# Patient Record
Sex: FEMALE | Race: WHITE | NOT HISPANIC OR LATINO | Employment: OTHER | URBAN - METROPOLITAN AREA
[De-identification: names, ages, dates, MRNs, and addresses within clinical notes are randomized per-mention and may not be internally consistent; named-entity substitution may affect disease eponyms.]

---

## 2022-10-05 ENCOUNTER — HOSPITAL ENCOUNTER (OUTPATIENT)
Dept: RADIOLOGY | Facility: HOSPITAL | Age: 67
Discharge: HOME | End: 2022-10-05
Payer: MEDICARE

## 2022-10-05 DIAGNOSIS — M54.2 NECK PAIN: ICD-10-CM

## 2022-10-05 PROCEDURE — 72141 MRI NECK SPINE W/O DYE: CPT

## 2022-10-07 ENCOUNTER — HOSPITAL ENCOUNTER (OUTPATIENT)
Dept: RADIOLOGY | Facility: HOSPITAL | Age: 67
Discharge: HOME | End: 2022-10-07
Payer: MEDICARE

## 2022-10-07 DIAGNOSIS — M54.9 BACK PAIN, UNSPECIFIED BACK LOCATION, UNSPECIFIED BACK PAIN LATERALITY, UNSPECIFIED CHRONICITY: ICD-10-CM

## 2022-10-07 PROCEDURE — 72148 MRI LUMBAR SPINE W/O DYE: CPT

## 2022-11-29 ENCOUNTER — OFFICE VISIT (OUTPATIENT)
Dept: OTOLARYNGOLOGY | Facility: CLINIC | Age: 67
End: 2022-11-29

## 2022-11-29 ENCOUNTER — OFFICE VISIT (OUTPATIENT)
Dept: AUDIOLOGY | Facility: CLINIC | Age: 67
End: 2022-11-29

## 2022-11-29 VITALS — HEIGHT: 61 IN | WEIGHT: 123 LBS | TEMPERATURE: 98.2 F | BODY MASS INDEX: 23.22 KG/M2

## 2022-11-29 DIAGNOSIS — H60.8X1 CHRONIC REACTIVE OTITIS EXTERNA OF RIGHT EAR: ICD-10-CM

## 2022-11-29 DIAGNOSIS — H90.3 SENSORY HEARING LOSS, BILATERAL: ICD-10-CM

## 2022-11-29 DIAGNOSIS — R42 VERTIGO: Primary | ICD-10-CM

## 2022-11-29 RX ORDER — ESTRADIOL 1 MG/1
TABLET ORAL
COMMUNITY
Start: 2022-11-25

## 2022-11-29 RX ORDER — EZETIMIBE 10 MG/1
TABLET ORAL
COMMUNITY
Start: 2022-08-03

## 2022-11-29 RX ORDER — FLUCONAZOLE 150 MG/1
TABLET ORAL
COMMUNITY
Start: 2022-10-12

## 2022-11-29 RX ORDER — ENALAPRIL MALEATE 10 MG/1
10 TABLET ORAL DAILY
COMMUNITY

## 2022-11-29 RX ORDER — LEVOTHYROXINE SODIUM 0.1 MG/1
100 TABLET ORAL DAILY
COMMUNITY
Start: 2022-11-11

## 2022-11-29 RX ORDER — HYDROCODONE BITARTRATE AND ACETAMINOPHEN 5; 325 MG/1; MG/1
2 TABLET ORAL EVERY 6 HOURS PRN
COMMUNITY

## 2022-11-29 RX ORDER — CYCLOBENZAPRINE HCL 5 MG
5 TABLET ORAL 3 TIMES DAILY PRN
COMMUNITY
Start: 2022-10-12

## 2022-11-29 NOTE — ASSESSMENT & PLAN NOTE
Symptoms include dizziness since having URI (Covid) over a month ago  No cerumen impaction or serous fluid noted on exam  Small piece of dried skin debris removed right eac, this was not occluding  Discussed possible causes of vertigo including brain, cardiac, autoimmune, otitis media, sinusitis, viral illness, and inner ear concerns  Suggest audiogram today to further evaluate bilateral ears to rule out otitis media and meniere's disease  Audiogram today indicating mild bilateral high frequency SNHL, Tymps type A bilaterally, word discrimination 100%  No CHL  No otitis media  Treatment options include at home epley's, oral steroids, decongestants, nasal steroids, saline rinses lab studies, vestibular therapy,  VNG testing, neurology consultation, MRI brain with IAC  After discussion agreed to medication options Saline rinses daily, Fluticasone nasal spray one spray each nostril twice per day, Allegra D for one to two weeks  Home exercises and Physical therapy as soon as possible   She will consider PT but notes she is having back surgery next week and concerned about time frame     Follow up if needed for no improvement

## 2022-11-29 NOTE — PROGRESS NOTES
Assessment/Plan:    Vertigo  Symptoms include dizziness since having URI (Covid) over a month ago  No cerumen impaction or serous fluid noted on exam  Small piece of dried skin debris removed right eac, this was not occluding  Discussed possible causes of vertigo including brain, cardiac, autoimmune, otitis media, sinusitis, viral illness, and inner ear concerns  Suggest audiogram today to further evaluate bilateral ears to rule out otitis media and meniere's disease  Audiogram today indicating mild bilateral high frequency SNHL, Tymps type A bilaterally, word discrimination 100%  No CHL  No otitis media  Treatment options include at home epley's, oral steroids, decongestants, nasal steroids, saline rinses lab studies, vestibular therapy,  VNG testing, neurology consultation, MRI brain with IAC  After discussion agreed to medication options Saline rinses daily, Fluticasone nasal spray one spray each nostril twice per day, Allegra D for one to two weeks  Home exercises and Physical therapy as soon as possible   She will consider PT but notes she is having back surgery next week and concerned about time frame  Follow up if needed for no improvement        Chronic reactive otitis externa of right ear  Reviewed otitis externa bilaterally causing itching sensation  On exam noted mild irritation of right eac  Discussed natural process of oil and cerumen within the eac and restoring moisture  Reviewed options for treatment including watchful monitoring, hydrocortisone cream to the eac, oil based products, decreased q-tip usage  Ear drops script to pharmacy for 7 days           Diagnoses and all orders for this visit:    Vertigo  -     Ambulatory referral to Audiology  -     Ambulatory Referral to Physical Therapy;  Future    Chronic reactive otitis externa of right ear  -     neomycin-polymyxin-hydrocortisone (CORTISPORIN) otic solution; Administer 4 drops to the right ear 2 (two) times a day for 7 days    Other orders  -     levothyroxine 100 mcg tablet; Take 100 mcg by mouth daily  -     fluconazole (DIFLUCAN) 150 mg tablet; 1 PILL EVERY OTHER DAY X 3 DOSES (Patient not taking: Reported on 11/29/2022)  -     ezetimibe (ZETIA) 10 mg tablet  -     estradiol (ESTRACE) 1 mg tablet  -     enalapril (VASOTEC) 10 mg tablet; Take 10 mg by mouth daily  -     cyclobenzaprine (FLEXERIL) 5 mg tablet; Take 5 mg by mouth 3 (three) times a day as needed  -     Cholecalciferol 1 25 MG (41281 UT) capsule; Take 1 capsule by mouth once a week (Patient not taking: Reported on 11/29/2022)  -     HYDROcodone-acetaminophen (NORCO) 5-325 mg per tablet; Take 2 tablets by mouth every 6 (six) hours as needed for pain          Subjective:      Patient ID: Nick Escamilla is a 78 y o  female  Presents today as a new patient due to ear concerns  Symptoms occurring for couple of weeks  Bilateral ears feel blocked  Dizziness began few days ago  Occurs while lying down and if puts head back  Worse turning to right  Whooshing noise in ears  Ears feel sticky  No otalgia or otorrhea  No history of ear surgery  No current hearing aids  Sought treatment in urgent care one week ago who attempted clean ears  Pending surgery for lower back           The following portions of the patient's history were reviewed and updated as appropriate: allergies, current medications, past family history, past medical history, past social history, past surgical history and problem list     Review of Systems   Constitutional: Negative  HENT: Negative for congestion, ear discharge, ear pain, hearing loss, nosebleeds, postnasal drip, rhinorrhea, sinus pressure, sinus pain, sore throat, tinnitus and voice change  Eyes: Negative  Respiratory: Negative for chest tightness and shortness of breath  Cardiovascular: Negative  Gastrointestinal: Negative  Endocrine: Negative  Musculoskeletal: Negative  Skin: Negative for color change  Neurological: Positive for dizziness  Negative for numbness and headaches  Psychiatric/Behavioral: Negative  Objective:      Temp 98 2 °F (36 8 °C) (Temporal)   Ht 5' 1" (1 549 m)   Wt 55 8 kg (123 lb)   BMI 23 24 kg/m²          Physical Exam  Constitutional:       Appearance: She is well-developed and well-nourished  HENT:      Head: Normocephalic  Right Ear: Hearing, tympanic membrane, ear canal and external ear normal  No decreased hearing noted  No drainage or tenderness  Tympanic membrane is not perforated or erythematous  Left Ear: Hearing, tympanic membrane, ear canal and external ear normal  No decreased hearing noted  No drainage or tenderness  Tympanic membrane is not perforated or erythematous  Nose: Nose normal  No nasal deformity, septal deviation or sinus tenderness  Mouth/Throat:      Mouth: Oropharynx is clear and moist and mucous membranes are normal  Mucous membranes are not pale and not dry  No oral lesions  Dentition: Normal dentition  Pharynx: Uvula midline  No oropharyngeal exudate  Neck:      Trachea: No tracheal deviation  Cardiovascular:      Rate and Rhythm: Normal rate  Pulmonary:      Effort: Pulmonary effort is normal  No accessory muscle usage or respiratory distress  Musculoskeletal:      Right shoulder: Normal range of motion  Cervical back: Full passive range of motion without pain, normal range of motion and neck supple  Lymphadenopathy:      Cervical: No cervical adenopathy  Skin:     General: Skin is warm, dry and intact  Neurological:      Mental Status: She is alert and oriented to person, place, and time  Cranial Nerves: No cranial nerve deficit  Sensory: No sensory deficit  Psychiatric:         Mood and Affect: Mood and affect normal          Behavior: Behavior is cooperative

## 2022-11-29 NOTE — ASSESSMENT & PLAN NOTE
Reviewed otitis externa bilaterally causing itching sensation  On exam noted mild irritation of right eac  Discussed natural process of oil and cerumen within the eac and restoring moisture  Reviewed options for treatment including watchful monitoring, hydrocortisone cream to the eac, oil based products, decreased q-tip usage     Ear drops script to pharmacy for 7 days

## 2022-11-29 NOTE — PROGRESS NOTES
ENT HEARING EVALUATION    Name:  Migdalia Stanton  :  1955  Age:  78 y o  Date of Evaluation: 22     History: ENT Audiogram / Vertigo   Reason for visit: Migdalia Stanton is being seen today at the request of Ms Kenneth CARTAGENA for an evaluation of hearing as part of their initial ENT visit  EVALUATION:    Otoscopic Evaluation:   Right Ear: clear canal, narrow and small    Left Ear:   clear canal, narrow and small     Tympanometry:   Right: Type A - normal middle ear pressure and compliance   Left: Type A - normal middle ear pressure and compliance    Audiogram Results: Within or bordering normal limits, 250-4000 Hz, sloping to a mild SHL 9025-0403 Hz  Word recognition scores, in quiet, are 100% for both ears at 55 dBHL  *see attached audiogram    RECOMMENDATIONS:  1  Follow up per MITZI Reynolds   2   Further audiological testing (Balance) upon recommendation    Lise Lowry , [facility], [place]# [id number]  Clinical Audiologist

## 2023-03-02 ENCOUNTER — HOSPITAL ENCOUNTER (OUTPATIENT)
Dept: CT IMAGING | Facility: HOSPITAL | Age: 68
Discharge: HOME | End: 2023-03-02

## 2023-03-02 DIAGNOSIS — I10 ESSENTIAL (PRIMARY) HYPERTENSION: ICD-10-CM

## 2023-05-11 ENCOUNTER — HOSPITAL ENCOUNTER (OUTPATIENT)
Dept: RADIOLOGY | Facility: HOSPITAL | Age: 68
Discharge: HOME | End: 2023-05-11

## 2023-05-11 ENCOUNTER — APPOINTMENT (OUTPATIENT)
Dept: RADIOLOGY | Facility: HOSPITAL | Age: 68
End: 2023-05-11

## 2023-05-11 VITALS — BODY MASS INDEX: 22.47 KG/M2 | HEIGHT: 61 IN | WEIGHT: 119 LBS

## 2023-05-11 DIAGNOSIS — M81.0 AGE-RELATED OSTEOPOROSIS WITHOUT CURRENT PATHOLOGICAL FRACTURE: ICD-10-CM

## 2023-09-28 ENCOUNTER — HOSPITAL ENCOUNTER (OUTPATIENT)
Dept: RADIOLOGY | Facility: HOSPITAL | Age: 68
Discharge: HOME | End: 2023-09-28
Payer: MEDICARE

## 2023-09-28 DIAGNOSIS — M48.062 SPINAL STENOSIS, LUMBAR REGION, WITH NEUROGENIC CLAUDICATION: ICD-10-CM

## 2023-09-28 PROCEDURE — 72148 MRI LUMBAR SPINE W/O DYE: CPT

## 2023-09-28 PROCEDURE — G1004 CDSM NDSC: HCPCS

## 2023-12-12 ENCOUNTER — HOSPITAL ENCOUNTER (OUTPATIENT)
Dept: RADIOLOGY | Facility: HOSPITAL | Age: 68
Discharge: HOME | End: 2023-12-12
Payer: MEDICARE

## 2023-12-12 DIAGNOSIS — M75.102 ROTATOR CUFF SYNDROME OF LEFT SHOULDER: ICD-10-CM

## 2023-12-12 DIAGNOSIS — M25.512 LEFT SHOULDER PAIN, UNSPECIFIED CHRONICITY: ICD-10-CM

## 2023-12-12 PROCEDURE — 73221 MRI JOINT UPR EXTREM W/O DYE: CPT

## 2023-12-20 ENCOUNTER — HOSPITAL ENCOUNTER (OUTPATIENT)
Dept: RADIOLOGY | Facility: HOSPITAL | Age: 68
Discharge: HOME | End: 2023-12-20
Payer: MEDICARE

## 2023-12-20 DIAGNOSIS — J32.9 CHRONIC SINUSITIS, UNSPECIFIED LOCATION: ICD-10-CM

## 2023-12-20 PROCEDURE — 70220 X-RAY EXAM OF SINUSES: CPT

## 2024-02-28 ENCOUNTER — HOSPITAL ENCOUNTER (OUTPATIENT)
Dept: RADIOLOGY | Facility: HOSPITAL | Age: 69
Discharge: HOME | End: 2024-02-28
Payer: MEDICARE

## 2024-02-28 DIAGNOSIS — M54.12 BRACHIAL NEURITIS: ICD-10-CM

## 2024-02-28 PROCEDURE — 72141 MRI NECK SPINE W/O DYE: CPT

## 2024-02-28 PROCEDURE — G1004 CDSM NDSC: HCPCS

## 2024-06-03 ENCOUNTER — RA CDI HCC (OUTPATIENT)
Dept: OTHER | Facility: HOSPITAL | Age: 69
End: 2024-06-03

## 2024-06-06 RX ORDER — PROGESTERONE 100 MG/1
100 CAPSULE ORAL DAILY
COMMUNITY

## 2024-06-06 RX ORDER — DIAZEPAM 2 MG/1
2 TABLET ORAL EVERY 6 HOURS PRN
COMMUNITY
Start: 2024-02-26 | End: 2024-06-07 | Stop reason: SDUPTHER

## 2024-06-06 RX ORDER — FLUTICASONE PROPIONATE 50 MCG
1 SPRAY, SUSPENSION (ML) NASAL AS NEEDED
COMMUNITY

## 2024-06-06 RX ORDER — TIZANIDINE 4 MG/1
4 TABLET ORAL ONCE
COMMUNITY
Start: 2024-04-04

## 2024-06-06 RX ORDER — LORAZEPAM 2 MG/1
2 TABLET ORAL DAILY
COMMUNITY
End: 2024-06-07 | Stop reason: ALTCHOICE

## 2024-06-06 RX ORDER — AMOXICILLIN AND CLAVULANATE POTASSIUM 875; 125 MG/1; MG/1
1 TABLET, FILM COATED ORAL EVERY 12 HOURS SCHEDULED
COMMUNITY
Start: 2024-02-01 | End: 2024-06-07 | Stop reason: ALTCHOICE

## 2024-06-07 ENCOUNTER — OFFICE VISIT (OUTPATIENT)
Dept: FAMILY MEDICINE CLINIC | Facility: CLINIC | Age: 69
End: 2024-06-07
Payer: MEDICARE

## 2024-06-07 ENCOUNTER — TELEPHONE (OUTPATIENT)
Age: 69
End: 2024-06-07

## 2024-06-07 VITALS
OXYGEN SATURATION: 96 % | HEART RATE: 71 BPM | SYSTOLIC BLOOD PRESSURE: 136 MMHG | TEMPERATURE: 96.9 F | WEIGHT: 114 LBS | BODY MASS INDEX: 20.98 KG/M2 | DIASTOLIC BLOOD PRESSURE: 90 MMHG | RESPIRATION RATE: 18 BRPM | HEIGHT: 62 IN

## 2024-06-07 DIAGNOSIS — G89.29 CHRONIC CERVICAL PAIN: ICD-10-CM

## 2024-06-07 DIAGNOSIS — M19.032 PRIMARY OSTEOARTHRITIS OF BOTH WRISTS: ICD-10-CM

## 2024-06-07 DIAGNOSIS — Z12.31 ENCOUNTER FOR SCREENING MAMMOGRAM FOR BREAST CANCER: ICD-10-CM

## 2024-06-07 DIAGNOSIS — Z76.89 ENCOUNTER TO ESTABLISH CARE: ICD-10-CM

## 2024-06-07 DIAGNOSIS — J01.40 ACUTE NON-RECURRENT PANSINUSITIS: Primary | ICD-10-CM

## 2024-06-07 DIAGNOSIS — B37.31 YEAST VAGINITIS: ICD-10-CM

## 2024-06-07 DIAGNOSIS — N39.0 UTI (URINARY TRACT INFECTION), BACTERIAL: ICD-10-CM

## 2024-06-07 DIAGNOSIS — E78.01 FAMILIAL HYPERCHOLESTEROLEMIA: ICD-10-CM

## 2024-06-07 DIAGNOSIS — M19.031 PRIMARY OSTEOARTHRITIS OF BOTH WRISTS: ICD-10-CM

## 2024-06-07 DIAGNOSIS — Z13.0 SCREENING FOR DEFICIENCY ANEMIA: ICD-10-CM

## 2024-06-07 DIAGNOSIS — Z11.59 NEED FOR HEPATITIS C SCREENING TEST: ICD-10-CM

## 2024-06-07 DIAGNOSIS — Z13.1 SCREENING FOR DIABETES MELLITUS: ICD-10-CM

## 2024-06-07 DIAGNOSIS — I10 PRIMARY HYPERTENSION: ICD-10-CM

## 2024-06-07 DIAGNOSIS — E03.9 ACQUIRED HYPOTHYROIDISM: ICD-10-CM

## 2024-06-07 DIAGNOSIS — F43.23 ADJUSTMENT DISORDER WITH MIXED ANXIETY AND DEPRESSED MOOD: Primary | ICD-10-CM

## 2024-06-07 DIAGNOSIS — M54.2 CHRONIC CERVICAL PAIN: ICD-10-CM

## 2024-06-07 DIAGNOSIS — Z23 ENCOUNTER FOR IMMUNIZATION: ICD-10-CM

## 2024-06-07 DIAGNOSIS — Z12.11 SCREENING FOR COLON CANCER: ICD-10-CM

## 2024-06-07 DIAGNOSIS — Z79.899 HIGH RISK MEDICATION USE: ICD-10-CM

## 2024-06-07 DIAGNOSIS — A49.9 UTI (URINARY TRACT INFECTION), BACTERIAL: ICD-10-CM

## 2024-06-07 DIAGNOSIS — G89.29 CHRONIC RADICULAR PAIN OF LOWER BACK: ICD-10-CM

## 2024-06-07 DIAGNOSIS — M54.16 CHRONIC RADICULAR PAIN OF LOWER BACK: ICD-10-CM

## 2024-06-07 DIAGNOSIS — Z23 NEED FOR SHINGLES VACCINE: ICD-10-CM

## 2024-06-07 PROBLEM — Z98.1 S/P LUMBAR FUSION: Status: ACTIVE | Noted: 2022-12-07

## 2024-06-07 PROBLEM — M43.17 SPONDYLOLISTHESIS OF LUMBOSACRAL REGION: Status: ACTIVE | Noted: 2022-12-07

## 2024-06-07 LAB
SL AMB  POCT GLUCOSE, UA: NORMAL
SL AMB LEUKOCYTE ESTERASE,UA: 25
SL AMB POCT BILIRUBIN,UA: NEGATIVE
SL AMB POCT BLOOD,UA: NEGATIVE
SL AMB POCT CLARITY,UA: CLEAR
SL AMB POCT COLOR,UA: YELLOW
SL AMB POCT KETONES,UA: 15
SL AMB POCT NITRITE,UA: NEGATIVE
SL AMB POCT PH,UA: 6.5
SL AMB POCT SPECIFIC GRAVITY,UA: 1.01
SL AMB POCT URINE PROTEIN: NEGATIVE
SL AMB POCT UROBILINOGEN: NORMAL

## 2024-06-07 PROCEDURE — 99204 OFFICE O/P NEW MOD 45 MIN: CPT | Performed by: NURSE PRACTITIONER

## 2024-06-07 PROCEDURE — 90677 PCV20 VACCINE IM: CPT

## 2024-06-07 PROCEDURE — G0009 ADMIN PNEUMOCOCCAL VACCINE: HCPCS

## 2024-06-07 PROCEDURE — 81003 URINALYSIS AUTO W/O SCOPE: CPT | Performed by: NURSE PRACTITIONER

## 2024-06-07 RX ORDER — ENALAPRIL MALEATE 10 MG/1
10 TABLET ORAL DAILY
Qty: 30 TABLET | Refills: 0 | Status: SHIPPED | OUTPATIENT
Start: 2024-06-07 | End: 2024-06-07

## 2024-06-07 RX ORDER — FEXOFENADINE HCL 60 MG/1
60 TABLET, FILM COATED ORAL DAILY
COMMUNITY

## 2024-06-07 RX ORDER — CANDESARTAN 16 MG/1
16 TABLET ORAL DAILY
Qty: 90 TABLET | Refills: 0 | Status: SHIPPED | OUTPATIENT
Start: 2024-06-07

## 2024-06-07 RX ORDER — ROSUVASTATIN CALCIUM 10 MG/1
10 TABLET, COATED ORAL DAILY
COMMUNITY

## 2024-06-07 RX ORDER — ZOSTER VACCINE RECOMBINANT, ADJUVANTED 50 MCG/0.5
0.5 KIT INTRAMUSCULAR ONCE
Qty: 1 EACH | Refills: 1 | Status: SHIPPED | OUTPATIENT
Start: 2024-06-07 | End: 2024-06-07

## 2024-06-07 RX ORDER — DIAZEPAM 2 MG/1
2 TABLET ORAL DAILY PRN
Qty: 15 TABLET | Refills: 0 | Status: SHIPPED | OUTPATIENT
Start: 2024-06-07

## 2024-06-07 RX ORDER — PAROXETINE 10 MG/1
10 TABLET, FILM COATED ORAL DAILY
Qty: 90 TABLET | Refills: 0 | Status: SHIPPED | OUTPATIENT
Start: 2024-06-07

## 2024-06-07 RX ORDER — NALOXONE HYDROCHLORIDE 4 MG/.1ML
SPRAY NASAL
Qty: 1 EACH | Refills: 1 | Status: SHIPPED | OUTPATIENT
Start: 2024-06-07 | End: 2025-06-07

## 2024-06-07 NOTE — TELEPHONE ENCOUNTER
She can continue flonase and allegra and if no improvement over the weekend, I will call in an antibiotic on Monday.    Chiquita Medley

## 2024-06-07 NOTE — PROGRESS NOTES
Assessment/Plan:    1. Adjustment disorder with mixed anxiety and depressed mood  -     PARoxetine (PAXIL) 10 mg tablet; Take 1 tablet (10 mg total) by mouth daily  -     diazepam (VALIUM) 2 mg tablet; Take 1 tablet (2 mg total) by mouth daily as needed for anxiety    2. Acquired hypothyroidism  Assessment & Plan:  Pt taking levothyroxine. Reports excessive hair loss, fatigue.  Recheck thyroid studies, consider dose adjustment based on results.   Orders:  -     TSH, 3rd generation; Future  -     T4, free; Future    3. Primary hypertension  Assessment & Plan:  BP elevated in office today. Pt is taking enalapril 10 mg daily. Pt reports that she is in pain due to lower back pain.   I advise that we switch to candesartan 16 mg daily and monitor for overall improvement in blood pressures.  Recheck in one week. If still uncontrolled with increase dose to 32 mg daily at that time.  Orders:  -     candesartan (ATACAND) 16 mg tablet; Take 1 tablet (16 mg total) by mouth daily    4. Familial hypercholesterolemia  Assessment & Plan:  Pt is due for lipid panel. Order provided.  Brief discussion of nutrition, increase caloric intake overall.   Orders:  -     Lipid panel; Future    5. High risk medication use  -     naloxone (NARCAN) 4 mg/0.1 mL nasal spray; Administer 1 spray into a nostril. If no response after 2-3 minutes, give another dose in the other nostril using a new spray.    6. Yeast vaginitis  -     terconazole (TERAZOL 7) 0.4 % vaginal cream; Insert 1 applicator into the vagina daily at bedtime    7. Primary osteoarthritis of both wrists - stable, continue follow up with specialty    8. Chronic radicular pain of lower back  Assessment & Plan:  Following up with pain management.   Opioid management via specialty - reviewed risks with patient and .  Discussed use of narcan in detail in the office today.     9. Chronic cervical pain  Assessment & Plan:  Following up with pain management.   Opioid management via  specialty - reviewed risks with patient and .  Discussed use of narcan in detail in the office today.     10. UTI (urinary tract infection), bacterial  -     POCT urine dip auto non-scope    11. Encounter to establish care    12. Screening for colon cancer  -     Ambulatory Referral to Gastroenterology; Future    13. Encounter for immunization  -     Pneumococcal Conjugate Vaccine 20-valent (Pcv20)    14. Encounter for screening mammogram for breast cancer  -     Mammo screening bilateral w 3d & cad; Future; Expected date: 06/07/2024    15. Need for hepatitis C screening test  -     Hepatitis C Antibody; Future    16. Screening for deficiency anemia  -     CBC and differential; Future    17. Screening for diabetes mellitus  -     Comprehensive metabolic panel; Future      Depression Screening and Follow-up Plan: Patient's depression screening was positive with a PHQ-2 score of 3. Their PHQ-9 score was 7. Patient assessed for underlying major depression. Brief counseling provided and recommend additional follow-up/re-evaluation next office visit. Patient advised to follow-up with PCP for further management.            Return in about 1 week (around 6/14/2024) for AWV, Blood pressure check.    Subjective:      Patient ID: Joselyn Maki is a 68 y.o. female.    Chief Complaint   Patient presents with    Establish Care     New patient       Joselyn is a 68 year old female with hypothyroidism, chronic neck pain and lower back pain, hypertension, hyperlipidemia, depression, anxiety and intermittent vertigo, who presents to the office to establish care with primary care provider. Pt is accompanied by her  who assisted in providing some of the history. Pt is a good historian. Reports that she continues to have lower back pain - recently had epidural injection yesterday. Reports osteoarthritis of both wrists that is managed with corticosteroid injections from specialist. Pt reports that she feels depressed due to  chronic lower back. States that she is unable to do the things that she wants to do due to the pain.         The following portions of the patient's history were reviewed and updated as appropriate: allergies, current medications, past family history, past medical history, past social history, past surgical history and problem list.    Review of Systems   Constitutional:  Positive for fatigue. Negative for chills and fever.   HENT:  Positive for postnasal drip, sinus pressure and sinus pain.         Hair loss   Respiratory:  Negative for shortness of breath.    Cardiovascular:  Negative for chest pain.   Musculoskeletal:  Positive for back pain and neck pain.   Psychiatric/Behavioral:  Positive for dysphoric mood. The patient is nervous/anxious.          Current Outpatient Medications   Medication Sig Dispense Refill    candesartan (ATACAND) 16 mg tablet Take 1 tablet (16 mg total) by mouth daily 90 tablet 0    diazepam (VALIUM) 2 mg tablet Take 1 tablet (2 mg total) by mouth daily as needed for anxiety 15 tablet 0    estradiol (ESTRACE) 1 mg tablet       fexofenadine (ALLEGRA) 60 MG tablet Take 60 mg by mouth daily      fluticasone (FLONASE) 50 mcg/act nasal spray 1 spray into each nostril as needed      HYDROcodone-acetaminophen (NORCO) 5-325 mg per tablet Take 2 tablets by mouth every 6 (six) hours as needed for pain      levothyroxine 100 mcg tablet Take 100 mcg by mouth daily      naloxone (NARCAN) 4 mg/0.1 mL nasal spray Administer 1 spray into a nostril. If no response after 2-3 minutes, give another dose in the other nostril using a new spray. 1 each 1    PARoxetine (PAXIL) 10 mg tablet Take 1 tablet (10 mg total) by mouth daily 90 tablet 0    Progesterone 100 MG CAPS Take 100 mg by mouth daily      rosuvastatin (CRESTOR) 10 MG tablet Take 10 mg by mouth daily      terconazole (TERAZOL 7) 0.4 % vaginal cream Insert 1 applicator into the vagina daily at bedtime 45 g 0    tiZANidine (ZANAFLEX) 4 mg tablet Take  "4 mg by mouth once       No current facility-administered medications for this visit.       Objective:    /90 (BP Location: Left arm, Patient Position: Sitting, Cuff Size: Standard)   Pulse 71   Temp (!) 96.9 °F (36.1 °C) (Temporal)   Resp 18   Ht 5' 2\" (1.575 m)   Wt 51.7 kg (114 lb)   SpO2 96%   BMI 20.85 kg/m²        Physical Exam  Vitals reviewed.   Constitutional:       Appearance: Normal appearance.   HENT:      Head: Normocephalic and atraumatic.   Cardiovascular:      Rate and Rhythm: Normal rate and regular rhythm.      Heart sounds: Normal heart sounds.   Pulmonary:      Effort: Pulmonary effort is normal.      Breath sounds: Normal breath sounds.   Neurological:      Mental Status: She is alert and oriented to person, place, and time.   Psychiatric:         Mood and Affect: Mood normal.                MITZI Otero  "

## 2024-06-07 NOTE — ASSESSMENT & PLAN NOTE
BP elevated in office today. Pt is taking enalapril 10 mg daily. Pt reports that she is in pain due to lower back pain.   I advise that we switch to candesartan 16 mg daily and monitor for overall improvement in blood pressures.  Recheck in one week. If still uncontrolled with increase dose to 32 mg daily at that time.

## 2024-06-07 NOTE — ASSESSMENT & PLAN NOTE
Following up with pain management.   Opioid management via specialty - reviewed risks with patient and .  Discussed use of narcan in detail in the office today.

## 2024-06-07 NOTE — ASSESSMENT & PLAN NOTE
Pt taking levothyroxine. Reports excessive hair loss, fatigue.  Recheck thyroid studies, consider dose adjustment based on results.

## 2024-06-07 NOTE — TELEPHONE ENCOUNTER
PT has underdeveloped sinus and is having some sinus headache and her face hurts especially under her eyes and also starting to lose her voice. Not sure if you want her to take an antibiotic? Currently taking flonase and allegra at this time. Please call pt to advise

## 2024-06-07 NOTE — ASSESSMENT & PLAN NOTE
Pt is due for lipid panel. Order provided.  Brief discussion of nutrition, increase caloric intake overall.

## 2024-06-10 RX ORDER — AMOXICILLIN AND CLAVULANATE POTASSIUM 500; 125 MG/1; MG/1
1 TABLET, FILM COATED ORAL EVERY 12 HOURS SCHEDULED
Qty: 20 TABLET | Refills: 0 | Status: SHIPPED | OUTPATIENT
Start: 2024-06-10 | End: 2024-06-20 | Stop reason: ALTCHOICE

## 2024-06-10 NOTE — TELEPHONE ENCOUNTER
I spoke with Joselyn,  she stated she does not feel better.  Her sinuses are still killing her.  She is asking if you could please call in something for her to her pharmacy.  Thank You

## 2024-06-12 LAB
ALBUMIN SERPL-MCNC: 4.3 G/DL (ref 3.6–5.1)
ALBUMIN/GLOB SERPL: 2 (CALC) (ref 1–2.5)
ALP SERPL-CCNC: 54 U/L (ref 37–153)
ALT SERPL-CCNC: 14 U/L (ref 6–29)
AST SERPL-CCNC: 17 U/L (ref 10–35)
BASOPHILS # BLD AUTO: 60 CELLS/UL (ref 0–200)
BASOPHILS NFR BLD AUTO: 0.7 %
BILIRUB SERPL-MCNC: 0.5 MG/DL (ref 0.2–1.2)
BUN SERPL-MCNC: 9 MG/DL (ref 7–25)
BUN/CREAT SERPL: NORMAL (CALC) (ref 6–22)
CALCIUM SERPL-MCNC: 9.3 MG/DL (ref 8.6–10.4)
CHLORIDE SERPL-SCNC: 103 MMOL/L (ref 98–110)
CHOLEST SERPL-MCNC: 140 MG/DL
CHOLEST/HDLC SERPL: 1.9 (CALC)
CO2 SERPL-SCNC: 30 MMOL/L (ref 20–32)
CREAT SERPL-MCNC: 0.59 MG/DL (ref 0.5–1.05)
EOSINOPHIL # BLD AUTO: 247 CELLS/UL (ref 15–500)
EOSINOPHIL NFR BLD AUTO: 2.9 %
ERYTHROCYTE [DISTWIDTH] IN BLOOD BY AUTOMATED COUNT: 12.3 % (ref 11–15)
GFR/BSA.PRED SERPLBLD CYS-BASED-ARV: 99 ML/MIN/1.73M2
GLOBULIN SER CALC-MCNC: 2.1 G/DL (CALC) (ref 1.9–3.7)
GLUCOSE SERPL-MCNC: 91 MG/DL (ref 65–99)
HCT VFR BLD AUTO: 45.4 % (ref 35–45)
HCV AB SERPL QL IA: NORMAL
HDLC SERPL-MCNC: 72 MG/DL
HGB BLD-MCNC: 15.4 G/DL (ref 11.7–15.5)
LDLC SERPL CALC-MCNC: 53 MG/DL (CALC)
LYMPHOCYTES # BLD AUTO: 3077 CELLS/UL (ref 850–3900)
LYMPHOCYTES NFR BLD AUTO: 36.2 %
MCH RBC QN AUTO: 32.6 PG (ref 27–33)
MCHC RBC AUTO-ENTMCNC: 33.9 G/DL (ref 32–36)
MCV RBC AUTO: 96 FL (ref 80–100)
MONOCYTES # BLD AUTO: 799 CELLS/UL (ref 200–950)
MONOCYTES NFR BLD AUTO: 9.4 %
NEUTROPHILS # BLD AUTO: 4318 CELLS/UL (ref 1500–7800)
NEUTROPHILS NFR BLD AUTO: 50.8 %
NONHDLC SERPL-MCNC: 68 MG/DL (CALC)
PLATELET # BLD AUTO: 254 THOUSAND/UL (ref 140–400)
PMV BLD REES-ECKER: 10.9 FL (ref 7.5–12.5)
POTASSIUM SERPL-SCNC: 4.3 MMOL/L (ref 3.5–5.3)
PROT SERPL-MCNC: 6.4 G/DL (ref 6.1–8.1)
RBC # BLD AUTO: 4.73 MILLION/UL (ref 3.8–5.1)
SODIUM SERPL-SCNC: 139 MMOL/L (ref 135–146)
T4 FREE SERPL-MCNC: 1.3 NG/DL (ref 0.8–1.8)
TRIGL SERPL-MCNC: 72 MG/DL
TSH SERPL-ACNC: 2.98 MIU/L (ref 0.4–4.5)
WBC # BLD AUTO: 8.5 THOUSAND/UL (ref 3.8–10.8)

## 2024-06-18 RX ORDER — PREDNISONE 20 MG/1
TABLET ORAL
Qty: 11 TABLET | Refills: 0 | Status: SHIPPED | OUTPATIENT
Start: 2024-06-18

## 2024-06-18 NOTE — TELEPHONE ENCOUNTER
Normally I would prefer a visit, but I know we are short a provider. If symptoms persist, she should be seen. Rx sent to pharmacy.     MITZI Otero

## 2024-06-18 NOTE — TELEPHONE ENCOUNTER
Patient called and stated she had a reaction to the pneumonia shot she had at her last visit.  Her left arm became red, swollen and she had the chills.  It is better now.  She was not sure if this had anything to do with the sinus infection she took the antibiotic for and just wanted to let MITZI Quintanilla know.     Patient is scheduled for an AWV on Thursday, 6/20.  She stated she has back and neck pain from surgery she had in 2022, where a macie was placed in her back.  She is due for an epidural and will be calling to schedule it, but would like to know if she can have a prescription for prednisone sent to [facility] in the meantime for the pain as she has a wedding this weekend. Please advise.

## 2024-06-20 ENCOUNTER — OFFICE VISIT (OUTPATIENT)
Dept: FAMILY MEDICINE CLINIC | Facility: CLINIC | Age: 69
End: 2024-06-20
Payer: MEDICARE

## 2024-06-20 VITALS
TEMPERATURE: 97.7 F | RESPIRATION RATE: 16 BRPM | SYSTOLIC BLOOD PRESSURE: 120 MMHG | OXYGEN SATURATION: 94 % | DIASTOLIC BLOOD PRESSURE: 86 MMHG | HEART RATE: 55 BPM | WEIGHT: 115 LBS | HEIGHT: 62 IN | BODY MASS INDEX: 21.16 KG/M2

## 2024-06-20 DIAGNOSIS — Z00.00 MEDICARE ANNUAL WELLNESS VISIT, SUBSEQUENT: Primary | ICD-10-CM

## 2024-06-20 DIAGNOSIS — E78.01 FAMILIAL HYPERCHOLESTEROLEMIA: ICD-10-CM

## 2024-06-20 DIAGNOSIS — M54.2 CHRONIC CERVICAL PAIN: ICD-10-CM

## 2024-06-20 DIAGNOSIS — Z12.11 SCREENING FOR COLON CANCER: ICD-10-CM

## 2024-06-20 DIAGNOSIS — I10 PRIMARY HYPERTENSION: ICD-10-CM

## 2024-06-20 DIAGNOSIS — M54.16 CHRONIC RADICULAR PAIN OF LOWER BACK: ICD-10-CM

## 2024-06-20 DIAGNOSIS — M19.032 PRIMARY OSTEOARTHRITIS OF BOTH WRISTS: ICD-10-CM

## 2024-06-20 DIAGNOSIS — F33.9 DEPRESSION, RECURRENT (HCC): ICD-10-CM

## 2024-06-20 DIAGNOSIS — M19.031 PRIMARY OSTEOARTHRITIS OF BOTH WRISTS: ICD-10-CM

## 2024-06-20 DIAGNOSIS — E03.9 ACQUIRED HYPOTHYROIDISM: ICD-10-CM

## 2024-06-20 DIAGNOSIS — F17.211 NICOTINE DEPENDENCE, CIGARETTES, IN REMISSION: ICD-10-CM

## 2024-06-20 DIAGNOSIS — G89.29 CHRONIC CERVICAL PAIN: ICD-10-CM

## 2024-06-20 DIAGNOSIS — G89.29 CHRONIC RADICULAR PAIN OF LOWER BACK: ICD-10-CM

## 2024-06-20 DIAGNOSIS — N39.46 MIXED STRESS AND URGE URINARY INCONTINENCE: ICD-10-CM

## 2024-06-20 PROCEDURE — G0438 PPPS, INITIAL VISIT: HCPCS | Performed by: NURSE PRACTITIONER

## 2024-06-20 PROCEDURE — 99214 OFFICE O/P EST MOD 30 MIN: CPT | Performed by: NURSE PRACTITIONER

## 2024-06-20 RX ORDER — ENALAPRIL MALEATE 10 MG/1
10 TABLET ORAL DAILY
COMMUNITY
Start: 2024-06-07 | End: 2024-06-20 | Stop reason: HOSPADM

## 2024-06-20 NOTE — ASSESSMENT & PLAN NOTE
Currently on prednisone taper. Pt reports improvement overall.  Continue follow up with ortho/pain management as recommend by specialty.

## 2024-06-20 NOTE — PATIENT INSTRUCTIONS
Medicare Preventive Visit Patient Instructions  Thank you for completing your Welcome to Medicare Visit or Medicare Annual Wellness Visit today. Your next wellness visit will be due in one year (6/21/2025).  The screening/preventive services that you may require over the next 5-10 years are detailed below. Some tests may not apply to you based off risk factors and/or age. Screening tests ordered at today's visit but not completed yet may show as past due. Also, please note that scanned in results may not display below.  Preventive Screenings:  Service Recommendations Previous Testing/Comments   Colorectal Cancer Screening  * Colonoscopy    * Fecal Occult Blood Test (FOBT)/Fecal Immunochemical Test (FIT)  * Fecal DNA/Cologuard Test  * Flexible Sigmoidoscopy Age: 45-75 years old   Colonoscopy: every 10 years (may be performed more frequently if at higher risk)  OR  FOBT/FIT: every 1 year  OR  Cologuard: every 3 years  OR  Sigmoidoscopy: every 5 years  Screening may be recommended earlier than age 45 if at higher risk for colorectal cancer. Also, an individualized decision between you and your healthcare provider will decide whether screening between the ages of 76-85 would be appropriate. Colonoscopy: Not on file  FOBT/FIT: Not on file  Cologuard: Not on file  Sigmoidoscopy: Not on file          Breast Cancer Screening Age: 40+ years old  Frequency: every 1-2 years  Not required if history of left and right mastectomy Mammogram: Not on file        Cervical Cancer Screening Between the ages of 21-29, pap smear recommended once every 3 years.   Between the ages of 30-65, can perform pap smear with HPV co-testing every 5 years.   Recommendations may differ for women with a history of total hysterectomy, cervical cancer, or abnormal pap smears in past. Pap Smear: Not on file    Screening Not Indicated   Hepatitis C Screening Once for adults born between 1945 and 1965  More frequently in patients at high risk for Hepatitis  C Hep C Antibody: 06/11/2024    Screening Current   Diabetes Screening 1-2 times per year if you're at risk for diabetes or have pre-diabetes Fasting glucose: No results in last 5 years (No results in last 5 years)  A1C: No results in last 5 years (No results in last 5 years)  Screening Current   Cholesterol Screening Once every 5 years if you don't have a lipid disorder. May order more often based on risk factors. Lipid panel: 06/11/2024    Screening Not Indicated  History Lipid Disorder     Other Preventive Screenings Covered by Medicare:  Abdominal Aortic Aneurysm (AAA) Screening: covered once if your at risk. You're considered to be at risk if you have a family history of AAA.  Lung Cancer Screening: covers low dose CT scan once per year if you meet all of the following conditions: (1) Age 55-77; (2) No signs or symptoms of lung cancer; (3) Current smoker or have quit smoking within the last 15 years; (4) You have a tobacco smoking history of at least 20 pack years (packs per day multiplied by number of years you smoked); (5) You get a written order from a healthcare provider.  Glaucoma Screening: covered annually if you're considered high risk: (1) You have diabetes OR (2) Family history of glaucoma OR (3)  aged 50 and older OR (4)  American aged 65 and older  Osteoporosis Screening: covered every 2 years if you meet one of the following conditions: (1) You're estrogen deficient and at risk for osteoporosis based off medical history and other findings; (2) Have a vertebral abnormality; (3) On glucocorticoid therapy for more than 3 months; (4) Have primary hyperparathyroidism; (5) On osteoporosis medications and need to assess response to drug therapy.   Last bone density test (DXA Scan): 05/11/2023.  HIV Screening: covered annually if you're between the age of 15-65. Also covered annually if you are younger than 15 and older than 65 with risk factors for HIV infection. For pregnant  patients, it is covered up to 3 times per pregnancy.    Immunizations:  Immunization Recommendations   Influenza Vaccine Annual influenza vaccination during flu season is recommended for all persons aged >= 6 months who do not have contraindications   Pneumococcal Vaccine   * Pneumococcal conjugate vaccine = PCV13 (Prevnar 13), PCV15 (Vaxneuvance), PCV20 (Prevnar 20)  * Pneumococcal polysaccharide vaccine = PPSV23 (Pneumovax) Adults 19-63 yo with certain risk factors or if 65+ yo  If never received any pneumonia vaccine: recommend Prevnar 20 (PCV20)  Give PCV20 if previously received 1 dose of PCV13 or PPSV23   Hepatitis B Vaccine 3 dose series if at intermediate or high risk (ex: diabetes, end stage renal disease, liver disease)   Respiratory syncytial virus (RSV) Vaccine - COVERED BY MEDICARE PART D  * RSVPreF3 (Arexvy) CDC recommends that adults 60 years of age and older may receive a single dose of RSV vaccine using shared clinical decision-making (SCDM)   Tetanus (Td) Vaccine - COST NOT COVERED BY MEDICARE PART B Following completion of primary series, a booster dose should be given every 10 years to maintain immunity against tetanus. Td may also be given as tetanus wound prophylaxis.   Tdap Vaccine - COST NOT COVERED BY MEDICARE PART B Recommended at least once for all adults. For pregnant patients, recommended with each pregnancy.   Shingles Vaccine (Shingrix) - COST NOT COVERED BY MEDICARE PART B  2 shot series recommended in those 19 years and older who have or will have weakened immune systems or those 50 years and older     Health Maintenance Due:      Topic Date Due   • Breast Cancer Screening: Mammogram  Never done   • Colorectal Cancer Screening  Never done   • Hepatitis C Screening  Completed     Immunizations Due:      Topic Date Due   • COVID-19 Vaccine (1 - 2023-24 season) Never done   • Influenza Vaccine (Season Ended) 09/01/2024     Advance Directives   What are advance directives?  Advance  directives are legal documents that state your wishes and plans for medical care. These plans are made ahead of time in case you lose your ability to make decisions for yourself. Advance directives can apply to any medical decision, such as the treatments you want, and if you want to donate organs.   What are the types of advance directives?  There are many types of advance directives, and each state has rules about how to use them. You may choose a combination of any of the following:  Living will:  This is a written record of the treatment you want. You can also choose which treatments you do not want, which to limit, and which to stop at a certain time. This includes surgery, medicine, IV fluid, and tube feedings.   Durable power of  for healthcare (DPAHC):  This is a written record that states who you want to make healthcare choices for you when you are unable to make them for yourself. This person, called a proxy, is usually a family member or a friend. You may choose more than 1 proxy.  Do not resuscitate (DNR) order:  A DNR order is used in case your heart stops beating or you stop breathing. It is a request not to have certain forms of treatment, such as CPR. A DNR order may be included in other types of advance directives.  Medical directive:  This covers the care that you want if you are in a coma, near death, or unable to make decisions for yourself. You can list the treatments you want for each condition. Treatment may include pain medicine, surgery, blood transfusions, dialysis, IV or tube feedings, and a ventilator (breathing machine).  Values history:  This document has questions about your views, beliefs, and how you feel and think about life. This information can help others choose the care that you would choose.  Why are advance directives important?  An advance directive helps you control your care. Although spoken wishes may be used, it is better to have your wishes written down. Spoken  wishes can be misunderstood, or not followed. Treatments may be given even if you do not want them. An advance directive may make it easier for your family to make difficult choices about your care.   Urinary Incontinence   Urinary incontinence (UI)  is when you lose control of your bladder. UI develops because your bladder cannot store or empty urine properly. The 3 most common types of UI are stress incontinence, urge incontinence, or both.  Medicines:   May be given to help strengthen your bladder control. Report any side effects of medication to your healthcare provider.  Do pelvic muscle exercises often:  Your pelvic muscles help you stop urinating. Squeeze these muscles tight for 5 seconds, then relax for 5 seconds. Gradually work up to squeezing for 10 seconds. Do 3 sets of 15 repetitions a day, or as directed. This will help strengthen your pelvic muscles and improve bladder control.  Train your bladder:  Go to the bathroom at set times, such as every 2 hours, even if you do not feel the urge to go. You can also try to hold your urine when you feel the urge to go. For example, hold your urine for 5 minutes when you feel the urge to go. As that becomes easier, hold your urine for 10 minutes.   Self-care:   Keep a UI record.  Write down how often you leak urine and how much you leak. Make a note of what you were doing when you leaked urine.  Drink liquids as directed. You may need to limit the amount of liquid you drink to help control your urine leakage. Do not drink any liquid right before you go to bed. Limit or do not have drinks that contain caffeine or alcohol.   Prevent constipation.  Eat a variety of high-fiber foods. Good examples are high-fiber cereals, beans, vegetables, and whole-grain breads. Walking is the best way to trigger your intestines to have a bowel movement.  Exercise regularly and maintain a healthy weight.  Weight loss and exercise will decrease pressure on your bladder and help you  control your leakage.   Use a catheter as directed  to help empty your bladder. A catheter is a tiny, plastic tube that is put into your bladder to drain your urine.   Go to behavior therapy as directed.  Behavior therapy may be used to help you learn to control your urge to urinate.    Narcotic (Opioid) Safety    Use narcotics safely:  Take prescribed narcotics exactly as directed  Do not give narcotics to others or take narcotics that belong to someone else  Do not mix narcotics without medicines or alcohol  Do not drive or operate heavy machinery after you take the narcotic  Monitor for side effects and notify your healthcare provider if you experienced side effects such as nausea, sleepiness, itching, or trouble thinking clearly.    Manage constipation:    Constipation is the most common side effect of narcotic medicine. Constipation is when you have hard, dry bowel movements, or you go longer than usual between bowel movements. Tell your healthcare provider about all changes in your bowel movements while you are taking narcotics. He or she may recommend laxative medicine to help you have a bowel movement. He or she may also change the kind of narcotic you are taking, or change when you take it. The following are more ways you can prevent or relieve constipation:    Drink liquids as directed.  You may need to drink extra liquids to help soften and move your bowels. Ask how much liquid to drink each day and which liquids are best for you.  Eat high-fiber foods.  This may help decrease constipation by adding bulk to your bowel movements. High-fiber foods include fruits, vegetables, whole-grain breads and cereals, and beans. Your healthcare provider or dietitian can help you create a high-fiber meal plan. Your provider may also recommend a fiber supplement if you cannot get enough fiber from food.  Exercise regularly.  Regular physical activity can help stimulate your intestines. Walking is a good exercise to  prevent or relieve constipation. Ask which exercises are best for you.  Schedule a time each day to have a bowel movement.  This may help train your body to have regular bowel movements. Bend forward while you are on the toilet to help move the bowel movement out. Sit on the toilet for at least 10 minutes, even if you do not have a bowel movement.    Store narcotics safely:   Store narcotics where others cannot easily get them.  Keep them in a locked cabinet or secure area. Do not  keep them in a purse or other bag you carry with you. A person may be looking for something else and find the narcotics.  Make sure narcotics are stored out of the reach of children.  A child can easily overdose on narcotics. Narcotics may look like candy to a small child.    The best way to dispose of narcotics:      The laws vary by country and area. In the [place], the best way is to return the narcotics through a take-back program. This program is offered by the [place] Drug Enforcement Agency ([employer]). The following are options for using the program:  Take the narcotics to a [employer] collection site.  The site is often a law enforcement center. Call your local law enforcement center for scheduled take-back days in your area. You will be given information on where to go if the collection site is in a different location.  Take the narcotics to an approved pharmacy or hospital.  A pharmacy or hospital may be set up as a collection site. You will need to ask if it is a [employer] collection site if you were not directed there. A pharmacy or doctor's office may not be able to take back narcotics unless it is a [employer] site.  Use a mail-back system.  This means you are given containers to put the narcotics into. You will then mail them in the containers.  Use a take-back drop box.  This is a place to leave the narcotics at any time. People and animals will not be able to get into the box. Your local law enforcement agency can tell you where to find a drop  box in your area.    Other ways to manage pain:   Ask your healthcare provider about non-narcotic medicines to control pain.  Nonprescription medicines include NSAIDs (such as ibuprofen) and acetaminophen. Prescription medicines include muscle relaxers, antidepressants, and steroids.  Pain may be managed without any medicines.  Some ways to relieve pain include massage, aromatherapy, or meditation. Physical or occupational therapy may also help.    For more information:   Drug Enforcement Administration  [address] [place] [address]  Phone: 7- 925 - 596-6703  Web Address: https://www.deadiversion.[place].gov/drug_disposal/    [place] Food and Drug Administration  [address] [place] [address]  Phone: 7- 638 - 844-0060  Web Address: http://www.fda.gov     © Copyright [employer] 2018 Information is for End User's use only and may not be sold, redistributed or otherwise used for commercial purposes. All illustrations and images included in CareNotes® are the copyrighted property of A.D.A.M., Inc. or icix

## 2024-06-20 NOTE — ASSESSMENT & PLAN NOTE
Reviewed lipid panel with pt in the office.  Tolerating Crestor without issus at this time.  Continue medications as directed.

## 2024-06-20 NOTE — ASSESSMENT & PLAN NOTE
Pt reports overall improvement with use of paxil 10 mg daily.  Continue medications as directed.  Encourage self care and healthy coping.

## 2024-06-20 NOTE — PROGRESS NOTES
Spoke to Joselyn and relayed Chiquita's message.  Gave her central scheduling's number.  Informed her that her medicare does not require a PA but her secondary may.  Informed her to schedule the appt and then our PA department will do their thing    No further action required

## 2024-06-20 NOTE — ASSESSMENT & PLAN NOTE
Pt recently changed from verapamil to candesartan due to s/e's.   Pt reports she is tolerating without issues at this time. BP stable in office today.  No changes.

## 2024-08-02 ENCOUNTER — APPOINTMENT (OUTPATIENT)
Dept: LAB | Facility: HOSPITAL | Age: 69
End: 2024-08-02
Payer: MEDICARE

## 2024-08-02 DIAGNOSIS — M25.50 PAIN IN JOINT, MULTIPLE SITES: ICD-10-CM

## 2024-08-02 LAB
ALBUMIN SERPL BCG-MCNC: 4 G/DL (ref 3.5–5)
ANA SER QL IA: NEGATIVE
ANION GAP SERPL CALCULATED.3IONS-SCNC: 5 MMOL/L (ref 4–13)
BUN SERPL-MCNC: 11 MG/DL (ref 5–25)
C3 SERPL-MCNC: 105 MG/DL (ref 87–200)
C4 SERPL-MCNC: 34 MG/DL (ref 19–52)
CALCIUM SERPL-MCNC: 8.8 MG/DL (ref 8.4–10.2)
CHLORIDE SERPL-SCNC: 102 MMOL/L (ref 96–108)
CO2 SERPL-SCNC: 30 MMOL/L (ref 21–32)
CREAT SERPL-MCNC: 0.55 MG/DL (ref 0.6–1.3)
CRP SERPL QL: <1 MG/L
ERYTHROCYTE [SEDIMENTATION RATE] IN BLOOD: 2 MM/HOUR (ref 0–29)
GFR SERPL CREATININE-BSD FRML MDRD: 96 ML/MIN/1.73SQ M
GLUCOSE SERPL-MCNC: 88 MG/DL (ref 65–140)
PHOSPHATE SERPL-MCNC: 3.2 MG/DL (ref 2.3–4.1)
POTASSIUM SERPL-SCNC: 4.1 MMOL/L (ref 3.5–5.3)
RHEUMATOID FACT SER QL LA: NEGATIVE
SODIUM SERPL-SCNC: 137 MMOL/L (ref 135–147)

## 2024-08-02 PROCEDURE — 85652 RBC SED RATE AUTOMATED: CPT

## 2024-08-02 PROCEDURE — 85613 RUSSELL VIPER VENOM DILUTED: CPT

## 2024-08-02 PROCEDURE — 86430 RHEUMATOID FACTOR TEST QUAL: CPT

## 2024-08-02 PROCEDURE — 86225 DNA ANTIBODY NATIVE: CPT

## 2024-08-02 PROCEDURE — 86038 ANTINUCLEAR ANTIBODIES: CPT

## 2024-08-02 PROCEDURE — 85670 THROMBIN TIME PLASMA: CPT

## 2024-08-02 PROCEDURE — 86160 COMPLEMENT ANTIGEN: CPT

## 2024-08-02 PROCEDURE — 86140 C-REACTIVE PROTEIN: CPT

## 2024-08-02 PROCEDURE — 85705 THROMBOPLASTIN INHIBITION: CPT

## 2024-08-02 PROCEDURE — 36415 COLL VENOUS BLD VENIPUNCTURE: CPT

## 2024-08-02 PROCEDURE — 80069 RENAL FUNCTION PANEL: CPT

## 2024-08-02 PROCEDURE — 85732 THROMBOPLASTIN TIME PARTIAL: CPT

## 2024-08-03 LAB — DSDNA AB SER-ACNC: <1 IU/ML (ref 0–9)

## 2024-08-04 LAB
APTT SCREEN TO CONFIRM RATIO: 1.09 RATIO (ref 0–1.34)
CONFIRM APTT/NORMAL: 36 SEC (ref 0–47.6)
LA PPP-IMP: NORMAL
SCREEN APTT: 34.2 SEC (ref 0–43.5)
SCREEN DRVVT: 31.2 SEC (ref 0–47)
THROMBIN TIME: 17.6 SEC (ref 0–23)

## 2024-08-10 ENCOUNTER — HOSPITAL ENCOUNTER (OUTPATIENT)
Dept: RADIOLOGY | Facility: HOSPITAL | Age: 69
Discharge: HOME | End: 2024-08-10
Payer: MEDICARE

## 2024-08-10 DIAGNOSIS — M54.12 RADICULOPATHY, CERVICAL REGION: ICD-10-CM

## 2024-08-10 PROCEDURE — 72141 MRI NECK SPINE W/O DYE: CPT

## 2024-08-21 ENCOUNTER — TELEPHONE (OUTPATIENT)
Age: 69
End: 2024-08-21

## 2024-08-21 NOTE — TELEPHONE ENCOUNTER
Patient's  called to schedule NP derm appt, no referral, offered next available. Appt was declined

## 2024-08-26 ENCOUNTER — OFFICE VISIT (OUTPATIENT)
Age: 69
End: 2024-08-26
Payer: MEDICARE

## 2024-08-26 ENCOUNTER — APPOINTMENT (OUTPATIENT)
Dept: RADIOLOGY | Facility: CLINIC | Age: 69
End: 2024-08-26
Payer: MEDICARE

## 2024-08-26 VITALS
SYSTOLIC BLOOD PRESSURE: 122 MMHG | HEART RATE: 60 BPM | WEIGHT: 115 LBS | RESPIRATION RATE: 16 BRPM | BODY MASS INDEX: 21.16 KG/M2 | HEIGHT: 62 IN | DIASTOLIC BLOOD PRESSURE: 86 MMHG

## 2024-08-26 DIAGNOSIS — M20.12 HALLUX ABDUCTOVALGUS WITH BUNIONS, LEFT: Primary | ICD-10-CM

## 2024-08-26 DIAGNOSIS — M21.612 HALLUX ABDUCTOVALGUS WITH BUNIONS, LEFT: Primary | ICD-10-CM

## 2024-08-26 DIAGNOSIS — M20.11 HALLUX ABDUCTOVALGUS, RIGHT: ICD-10-CM

## 2024-08-26 DIAGNOSIS — M21.619 BUNION: ICD-10-CM

## 2024-08-26 PROCEDURE — 73630 X-RAY EXAM OF FOOT: CPT

## 2024-08-26 PROCEDURE — 99203 OFFICE O/P NEW LOW 30 MIN: CPT | Performed by: STUDENT IN AN ORGANIZED HEALTH CARE EDUCATION/TRAINING PROGRAM

## 2024-08-26 NOTE — PROGRESS NOTES
[facility] Office Visit    ASSESSMENT     Diagnoses and all orders for this visit:    Hallux abductovalgus with bunions, left  -     X-ray foot right 3+ views; Future  -     X-ray foot left 3+ views; Future    Hallux abductovalgus, right         Problem List Items Addressed This Visit    None  Visit Diagnoses       Hallux abductovalgus with bunions, left    -  Primary    Relevant Orders    X-ray foot right 3+ views (Completed)    X-ray foot left 3+ views (Completed)    Hallux abductovalgus, right              PLAN  -Patient was educated regarding their condition.  -We discussed conservative treatment vs surgical intervention. The patient has elected to attempt conservative therapy for now. I explained to her that although this will not correct their deformity, it may be helpful in reducing the pain.  -We did discuss various surgical options, patient states that she is leaning towards having this performed, she will call the office for an appointment when she is able to come in for a preoperative visit.  -Patient will RTC as needed for new or worsening podiatric concerns    -X-ray from  8/26/24  of the right foot interpreted independently. AP, Lateral, and sesamoid axial views noted. No fractures noted. IM angle 22. Hallux abductus angle 24. Hallux interphalangeus angle WNL. Tibial sesamoid position . 6-7 metatarsus primus elevatus noted. Cheo angle WNL. 1st MTPJ joint space slightly decreased medially. This x-ray is consistent with a severe hallux abductovalgus deformity.  Slight abduction of the second distal phalanx at the DIPJ noted.    -X-ray from 8/26/24 of the left foot interpreted independently. AP, Lateral, and sesamoid axial views noted. No fractures noted. IM angle 18. Hallux abductus angle 19. Hallux interphalangeus angle WNL. Tibial sesamoid position . 5-6 metatarsus primus elevatus noted. Cheo angle WNL. 1st MTPJ joint space slightly decreased medially. This x-ray is  "consistent with a severe hallux abductovalgus deformity.  Metatarsus primus elevatus noted    SUBJECTIVE    Chief Complaint:  Bilateral foot pain secondary to bunion deformity     Patient ID: Joselyn Maki     8/26/2024: Joselyn is a pleasant 69-year-old female who presents today for evaluation of her bilateral feet.  She states that she has pain to her bunions bilaterally, although her left foot pain is worst.  She states that there is numbness under the ball of her foot as well.  She states that she had a bunionectomy performed approximately 25 to 30 years ago, her bunions eventually came back.  She states that hurts with walking as well as rest.        The following portions of the patient's history were reviewed and updated as appropriate: allergies, current medications, past family history, past medical history, past social history, past surgical history and problem list.    Review of Systems   Constitutional: Negative.    HENT: Negative.     Respiratory: Negative.     Cardiovascular: Negative.    Gastrointestinal: Negative.    Musculoskeletal:  Positive for arthralgias.   Skin: Negative.    Neurological: Negative.          OBJECTIVE      /86   Pulse 60   Resp 16   Ht 5' 2\" (1.575 m)   Wt 52.2 kg (115 lb)   BMI 21.03 kg/m²        Physical Exam  Constitutional:       Appearance: Normal appearance.   HENT:      Head: Normocephalic and atraumatic.   Eyes:      General:         Right eye: No discharge.         Left eye: No discharge.   Cardiovascular:      Rate and Rhythm: Normal rate and regular rhythm.      Pulses:           Dorsalis pedis pulses are 2+ on the right side and 2+ on the left side.        Posterior tibial pulses are 2+ on the right side and 2+ on the left side.   Pulmonary:      Effort: Pulmonary effort is normal.      Breath sounds: Normal breath sounds.   Skin:     General: Skin is warm.      Capillary Refill: Capillary refill takes less than 2 seconds.   Neurological:      Sensory: " Sensation is intact. No sensory deficit.         MSK bilateral foot:  -Pain on palpation to the dorsal and medial aspect of the first MTPJ bilaterally  -Hallux IPJ ROM WNL  -Active range of motion lesser digits intact  -MMT 5/5 to all muscle compartments of the lower extremity  -I note a large prominence at the medial aspect of the 1st MTPJ of the bilateral foot. The hallux is in an abducted position with abutment of the second toe.   -Hallux abductovalgus deformity is tracking  -TMTJ hypermobility is not noted  -Range of motion   -Dorsiflexion of the first MTPJ of the right foot is approximately 60 degrees, 60 degrees to the left foot as well   -Plantarflexion of the first MTPJ of the right foot is approximately 20 degrees, 20 degrees to the left foot as well   -Ankle dorsiflexion of the bilateral feet with knee extended is approximately 10 degrees, slightly increased with knee flexion   -Ankle plantarflexion bilaterally is 40 degrees   -Subtalar joint inversion is 20 degrees bilaterally   -Subtalar joint eversion is 10 degrees bilaterally    Derm:  -No lesions, abrasions, or open wounds noted  -erythema noted at medial aspect of the first MTPJ bilaterally  -Calluses are not present    Vascular:  -DP and PT pulses intact b/l  -Capillary refill time <2 sec b/l  -Digital hair growth: Present  -Skin temp: WNL

## 2024-09-02 DIAGNOSIS — I10 PRIMARY HYPERTENSION: ICD-10-CM

## 2024-09-02 DIAGNOSIS — B37.31 YEAST VAGINITIS: ICD-10-CM

## 2024-09-03 RX ORDER — CANDESARTAN 16 MG/1
16 TABLET ORAL DAILY
Qty: 90 TABLET | Refills: 1 | Status: SHIPPED | OUTPATIENT
Start: 2024-09-03

## 2024-09-03 RX ORDER — TERCONAZOLE 0.4 %
1 CREAM WITH APPLICATOR VAGINAL
Qty: 45 G | Refills: 0 | Status: SHIPPED | OUTPATIENT
Start: 2024-09-03

## 2024-09-11 DIAGNOSIS — B37.31 YEAST VAGINITIS: Primary | ICD-10-CM

## 2024-09-11 DIAGNOSIS — Z78.0 POST-MENOPAUSAL: ICD-10-CM

## 2024-09-12 NOTE — TELEPHONE ENCOUNTER
Requested medication(s) are due for refill today: Unknown  Patient has already received a courtesy refill: Unknown  Other reason request has been forwarded to provider: Previously filled by Historical Provider, X Mammogram up-to-date per ,   Is this something filled by you or OB/GYN?

## 2024-09-12 NOTE — TELEPHONE ENCOUNTER
Message sent to patient requesting info needed to proceed with refill. No further action needed at this time.

## 2024-09-12 NOTE — TELEPHONE ENCOUNTER
I have never prescribed this for this patient. This was done by her previous provider. I need to know her breast cancer history of family history of breast cancer. Also how often she takes the 2mg. Is it daily?

## 2024-09-12 NOTE — TELEPHONE ENCOUNTER
Requested medication(s) are due for refill today: Unknown  Patient has already received a courtesy refill: Unknown  Other reason request has been forwarded to provider: Message sent to patient requesting info needed

## 2024-09-13 RX ORDER — ESTRADIOL 2 MG/1
2 TABLET ORAL DAILY
Qty: 30 TABLET | Refills: 2 | Status: SHIPPED | OUTPATIENT
Start: 2024-09-13

## 2024-09-14 DIAGNOSIS — F43.23 ADJUSTMENT DISORDER WITH MIXED ANXIETY AND DEPRESSED MOOD: ICD-10-CM

## 2024-09-15 RX ORDER — PAROXETINE 10 MG/1
10 TABLET, FILM COATED ORAL DAILY
Qty: 90 TABLET | Refills: 1 | Status: SHIPPED | OUTPATIENT
Start: 2024-09-15

## 2024-09-24 ENCOUNTER — APPOINTMENT (OUTPATIENT)
Dept: LAB | Facility: HOSPITAL | Age: 69
End: 2024-09-24
Payer: MEDICARE

## 2024-09-24 DIAGNOSIS — Z01.818 OTHER SPECIFIED PRE-OPERATIVE EXAMINATION: ICD-10-CM

## 2024-09-24 LAB
ALBUMIN SERPL BCG-MCNC: 3.9 G/DL (ref 3.5–5)
ALP SERPL-CCNC: 48 U/L (ref 34–104)
ALT SERPL W P-5'-P-CCNC: 9 U/L (ref 7–52)
ANION GAP SERPL CALCULATED.3IONS-SCNC: 4 MMOL/L (ref 4–13)
APTT PPP: 29 SECONDS (ref 23–34)
AST SERPL W P-5'-P-CCNC: 15 U/L (ref 13–39)
BILIRUB SERPL-MCNC: 0.45 MG/DL (ref 0.2–1)
BUN SERPL-MCNC: 12 MG/DL (ref 5–25)
CALCIUM SERPL-MCNC: 8.7 MG/DL (ref 8.4–10.2)
CHLORIDE SERPL-SCNC: 102 MMOL/L (ref 96–108)
CO2 SERPL-SCNC: 30 MMOL/L (ref 21–32)
CREAT SERPL-MCNC: 0.6 MG/DL (ref 0.6–1.3)
ERYTHROCYTE [DISTWIDTH] IN BLOOD BY AUTOMATED COUNT: 12.9 % (ref 11.6–15.1)
GFR SERPL CREATININE-BSD FRML MDRD: 93 ML/MIN/1.73SQ M
GLUCOSE SERPL-MCNC: 83 MG/DL (ref 65–140)
HCT VFR BLD AUTO: 44.6 % (ref 34.8–46.1)
HGB BLD-MCNC: 14.4 G/DL (ref 11.5–15.4)
INR PPP: 0.88 (ref 0.85–1.19)
MCH RBC QN AUTO: 32.7 PG (ref 26.8–34.3)
MCHC RBC AUTO-ENTMCNC: 32.3 G/DL (ref 31.4–37.4)
MCV RBC AUTO: 101 FL (ref 82–98)
PLATELET # BLD AUTO: 244 THOUSANDS/UL (ref 149–390)
PMV BLD AUTO: 10.5 FL (ref 8.9–12.7)
POTASSIUM SERPL-SCNC: 4.4 MMOL/L (ref 3.5–5.3)
PROT SERPL-MCNC: 6.3 G/DL (ref 6.4–8.4)
PROTHROMBIN TIME: 12.5 SECONDS (ref 12.3–15)
RBC # BLD AUTO: 4.4 MILLION/UL (ref 3.81–5.12)
SODIUM SERPL-SCNC: 136 MMOL/L (ref 135–147)
WBC # BLD AUTO: 7.52 THOUSAND/UL (ref 4.31–10.16)

## 2024-09-24 PROCEDURE — 85610 PROTHROMBIN TIME: CPT

## 2024-09-24 PROCEDURE — 85730 THROMBOPLASTIN TIME PARTIAL: CPT

## 2024-09-24 PROCEDURE — 80053 COMPREHEN METABOLIC PANEL: CPT

## 2024-09-24 PROCEDURE — 85027 COMPLETE CBC AUTOMATED: CPT

## 2024-09-24 PROCEDURE — 36415 COLL VENOUS BLD VENIPUNCTURE: CPT

## 2024-09-27 ENCOUNTER — HOSPITAL ENCOUNTER (OUTPATIENT)
Dept: RADIOLOGY | Facility: HOSPITAL | Age: 69
Discharge: HOME | End: 2024-09-27
Payer: MEDICARE

## 2024-09-27 ENCOUNTER — TELEPHONE (OUTPATIENT)
Age: 69
End: 2024-09-27

## 2024-09-27 DIAGNOSIS — M43.16 SPONDYLOLISTHESIS OF LUMBAR REGION: ICD-10-CM

## 2024-09-27 PROCEDURE — 72148 MRI LUMBAR SPINE W/O DYE: CPT

## 2024-09-27 NOTE — TELEPHONE ENCOUNTER
[facility] calling. Pt is having surgery with them and is having pre op done with Chiquita next week.  They faxed over labs for Chiquita to include with her pre op exam.  They want the office to be aware so they can look out for them.

## 2024-09-30 ENCOUNTER — CONSULT (OUTPATIENT)
Dept: FAMILY MEDICINE CLINIC | Facility: CLINIC | Age: 69
End: 2024-09-30
Payer: MEDICARE

## 2024-09-30 VITALS
TEMPERATURE: 97.1 F | WEIGHT: 115 LBS | SYSTOLIC BLOOD PRESSURE: 128 MMHG | DIASTOLIC BLOOD PRESSURE: 80 MMHG | OXYGEN SATURATION: 98 % | RESPIRATION RATE: 18 BRPM | BODY MASS INDEX: 21.16 KG/M2 | HEIGHT: 62 IN | HEART RATE: 68 BPM

## 2024-09-30 DIAGNOSIS — G89.29 CHRONIC RADICULAR PAIN OF LOWER BACK: ICD-10-CM

## 2024-09-30 DIAGNOSIS — G89.29 CHRONIC CERVICAL PAIN: ICD-10-CM

## 2024-09-30 DIAGNOSIS — I10 PRIMARY HYPERTENSION: ICD-10-CM

## 2024-09-30 DIAGNOSIS — M54.16 CHRONIC RADICULAR PAIN OF LOWER BACK: ICD-10-CM

## 2024-09-30 DIAGNOSIS — M54.2 CHRONIC CERVICAL PAIN: ICD-10-CM

## 2024-09-30 DIAGNOSIS — E78.01 FAMILIAL HYPERCHOLESTEROLEMIA: ICD-10-CM

## 2024-09-30 DIAGNOSIS — E03.9 ACQUIRED HYPOTHYROIDISM: ICD-10-CM

## 2024-09-30 DIAGNOSIS — Z01.818 PRE-OPERATIVE CLEARANCE: Primary | ICD-10-CM

## 2024-09-30 PROCEDURE — 99214 OFFICE O/P EST MOD 30 MIN: CPT | Performed by: NURSE PRACTITIONER

## 2024-09-30 PROCEDURE — 93000 ELECTROCARDIOGRAM COMPLETE: CPT | Performed by: NURSE PRACTITIONER

## 2024-09-30 RX ORDER — OXYCODONE AND ACETAMINOPHEN 10; 325 MG/1; MG/1
TABLET ORAL
COMMUNITY
Start: 2024-09-06

## 2024-09-30 NOTE — ASSESSMENT & PLAN NOTE
Joselyn is scheduled for   Reviewed PMH, medications, PATs including ECG and labs. [facility]  Pt is considered intermediate risk for surgery and is medically cleared to have scheduled procedure.

## 2024-09-30 NOTE — ASSESSMENT & PLAN NOTE
Pt is complaining of hair loss and has concerns about thyroid function.  Continue levothyroxine as directed. Thyroid studies wnl.   Will evaluate thyroid functions again in 3 months - consider endocrinology evaluation if symptoms persist.  Pt is scheduled with dermatology for further evaluation and hair loss.

## 2024-09-30 NOTE — PROGRESS NOTES
Assessment/Plan:    1. Pre-operative clearance  Assessment & Plan:  Joselyn is scheduled for   Reviewed PMH, medications, PATs including ECG and labs. WNL  Pt is considered intermediate risk for surgery and is medically cleared to have scheduled procedure.    Orders:  -     POCT ECG  2. Primary hypertension  Assessment & Plan:  BP wnl in office today.  Continue medications as directed.  Stable, no changes.   3. Acquired hypothyroidism  Assessment & Plan:  Pt is complaining of hair loss and has concerns about thyroid function.  Continue levothyroxine as directed. Thyroid studies wnl.   Will evaluate thyroid functions again in 3 months - consider endocrinology evaluation if symptoms persist.  Pt is scheduled with dermatology for further evaluation and hair loss.   4. Chronic radicular pain of lower back  5. Chronic cervical pain  6. Familial hypercholesterolemia          Return in about 3 months (around 12/30/2024) for Recheck.    Subjective:      Patient ID: Joselyn Maki is a 69 y.o. female.    Chief Complaint   Patient presents with    Pre-op Exam       Joselyn is a 69 year old female with hypertension, hypothyroidism, hyperlipidemia, chronic cervical pain, osteoarthritis, depression, urinary incontinence, vertigo and chronic lower back pain, who presents to the office for a preoperative clearance. Pt is scheduled for C5-C6 discectomy and cervical spinal fusion with Dr. Harris. Currently, she denies fever, chills, chest pain, shortness of breath, n/v/d. Reports chronic muscle and joint pain and chronic lower back pain. Pt reports worsening right shoulder pain and continues to have severe neck pain.         The following portions of the patient's history were reviewed and updated as appropriate: allergies, current medications, past family history, past medical history, past social history, past surgical history and problem list.    Review of Systems   Constitutional:  Negative for diaphoresis, fatigue and fever.   HENT:   Negative for ear pain and hearing loss.    Eyes:  Negative for pain and visual disturbance.   Respiratory:  Negative for chest tightness and shortness of breath.    Cardiovascular:  Negative for chest pain, palpitations and leg swelling.   Gastrointestinal:  Negative for abdominal pain, constipation and diarrhea.   Genitourinary:  Negative for difficulty urinating.   Musculoskeletal:  Positive for arthralgias, myalgias and neck pain.   Skin:  Negative for rash.   Neurological:  Negative for dizziness, numbness and headaches.   Psychiatric/Behavioral:  Negative for sleep disturbance.          Current Outpatient Medications   Medication Sig Dispense Refill    candesartan (ATACAND) 16 mg tablet Take 1 tablet (16 mg total) by mouth daily 90 tablet 1    diazepam (VALIUM) 2 mg tablet Take 1 tablet (2 mg total) by mouth daily as needed for anxiety 15 tablet 0    fexofenadine (ALLEGRA) 60 MG tablet Take 60 mg by mouth daily      fluticasone (FLONASE) 50 mcg/act nasal spray 1 spray into each nostril as needed      levothyroxine 100 mcg tablet Take 100 mcg by mouth daily      naloxone (NARCAN) 4 mg/0.1 mL nasal spray Administer 1 spray into a nostril. If no response after 2-3 minutes, give another dose in the other nostril using a new spray. 1 each 1    oxyCODONE-acetaminophen (PERCOCET)  mg per tablet       PARoxetine (PAXIL) 10 mg tablet Take 1 tablet (10 mg total) by mouth daily 90 tablet 1    Progesterone 100 MG CAPS Take 100 mg by mouth daily      rosuvastatin (CRESTOR) 10 MG tablet Take 10 mg by mouth daily      terconazole (TERAZOL 7) 0.4 % vaginal cream Insert 1 applicator into the vagina daily at bedtime 45 g 0    tiZANidine (ZANAFLEX) 4 mg tablet Take 4 mg by mouth daily as needed      estradiol (ESTRACE) 2 MG tablet Take 1 tablet (2 mg total) by mouth daily (Patient not taking: Reported on 9/30/2024) 30 tablet 2     No current facility-administered medications for this visit.       Objective:    /80  "(BP Location: Left arm, Patient Position: Sitting, Cuff Size: Large)   Pulse 68   Temp (!) 97.1 °F (36.2 °C) (Temporal)   Resp 18   Ht 5' 2\" (1.575 m)   Wt 52.2 kg (115 lb)   SpO2 98%   BMI 21.03 kg/m²        Physical Exam  Vitals reviewed.   Constitutional:       General: She is not in acute distress.     Appearance: Normal appearance. She is well-developed. She is not diaphoretic.   HENT:      Head: Normocephalic and atraumatic.      Right Ear: Tympanic membrane, ear canal and external ear normal.      Left Ear: Tympanic membrane, ear canal and external ear normal.      Mouth/Throat:      Mouth: Oropharynx is clear and moist and mucous membranes are normal.   Eyes:      General: Lids are normal.      Extraocular Movements: Extraocular movements intact and EOM normal.      Conjunctiva/sclera: Conjunctivae normal.      Pupils: Pupils are equal, round, and reactive to light.      Funduscopic exam:     Right eye: Red reflex present.         Left eye: Red reflex present.  Neck:      Thyroid: No thyroid mass or thyromegaly.      Vascular: No carotid bruit.   Cardiovascular:      Rate and Rhythm: Normal rate and regular rhythm.      Pulses: Normal pulses.      Heart sounds: Normal heart sounds, S1 normal and S2 normal. No murmur heard.  Pulmonary:      Effort: Pulmonary effort is normal.      Breath sounds: Normal breath sounds. No decreased breath sounds, wheezing, rhonchi or rales.   Abdominal:      General: Bowel sounds are normal. There is no distension.      Palpations: Abdomen is soft.      Tenderness: There is no abdominal tenderness.   Musculoskeletal:         General: No edema. Normal range of motion.      Cervical back: Full passive range of motion without pain, normal range of motion and neck supple.      Right lower leg: No edema.      Left lower leg: No edema.   Lymphadenopathy:      Cervical: No cervical adenopathy.      Upper Body:      Right upper body: No supraclavicular adenopathy.      Left " upper body: No supraclavicular adenopathy.   Skin:     General: Skin is warm and dry.      Findings: No rash.   Neurological:      General: No focal deficit present.      Mental Status: She is alert and oriented to person, place, and time.      Cranial Nerves: No cranial nerve deficit.      Sensory: No sensory deficit.      Coordination: Coordination normal.      Deep Tendon Reflexes: Reflexes are normal and symmetric.   Psychiatric:         Mood and Affect: Mood and affect normal.         Speech: Speech normal.         Behavior: Behavior normal.         Thought Content: Thought content normal.         Judgment: Judgment normal.              MITZI Otero

## 2024-10-31 ENCOUNTER — TELEPHONE (OUTPATIENT)
Age: 69
End: 2024-10-31

## 2024-10-31 NOTE — TELEPHONE ENCOUNTER
Faxed most recent bw ordered by Chiquita Medley is from 6/11/24.  Faxed as requested to fax# [phone].  No further action required

## 2024-10-31 NOTE — TELEPHONE ENCOUNTER
[facility] called requesting patients most recent lab results to be faxed to her at [phone]. Thank you.

## 2024-11-11 ENCOUNTER — APPOINTMENT (OUTPATIENT)
Dept: LAB | Facility: HOSPITAL | Age: 69
End: 2024-11-11
Payer: MEDICARE

## 2024-11-11 DIAGNOSIS — L65.0 TELOGEN EFFLUVIUM: ICD-10-CM

## 2024-11-11 DIAGNOSIS — L65.0 TELOGEN EFFLUVIUM: Primary | ICD-10-CM

## 2024-11-11 LAB
ANA SER QL IA: NEGATIVE
FERRITIN SERPL-MCNC: 73 NG/ML (ref 11–307)
IRON SERPL-MCNC: 79 UG/DL (ref 50–212)

## 2024-11-11 PROCEDURE — 82728 ASSAY OF FERRITIN: CPT

## 2024-11-11 PROCEDURE — 36415 COLL VENOUS BLD VENIPUNCTURE: CPT

## 2024-11-11 PROCEDURE — 83540 ASSAY OF IRON: CPT

## 2024-11-11 PROCEDURE — 86038 ANTINUCLEAR ANTIBODIES: CPT

## 2024-11-22 ENCOUNTER — HOSPITAL ENCOUNTER (OUTPATIENT)
Dept: RADIOLOGY | Facility: HOSPITAL | Age: 69
Discharge: HOME | End: 2024-11-22
Payer: MEDICARE

## 2024-11-22 ENCOUNTER — RESULTS FOLLOW-UP (OUTPATIENT)
Dept: FAMILY MEDICINE CLINIC | Facility: CLINIC | Age: 69
End: 2024-11-22

## 2024-11-22 DIAGNOSIS — F17.211 NICOTINE DEPENDENCE, CIGARETTES, IN REMISSION: ICD-10-CM

## 2024-11-22 DIAGNOSIS — Z12.31 ENCOUNTER FOR SCREENING MAMMOGRAM FOR BREAST CANCER: ICD-10-CM

## 2024-11-22 PROCEDURE — 77063 BREAST TOMOSYNTHESIS BI: CPT

## 2024-11-22 PROCEDURE — 77067 SCR MAMMO BI INCL CAD: CPT

## 2024-12-07 DIAGNOSIS — Z78.0 POST-MENOPAUSAL: ICD-10-CM

## 2024-12-09 ENCOUNTER — TELEPHONE (OUTPATIENT)
Age: 69
End: 2024-12-09

## 2024-12-09 RX ORDER — ESTRADIOL 2 MG/1
TABLET ORAL
Qty: 30 TABLET | Refills: 5 | Status: SHIPPED | OUTPATIENT
Start: 2024-12-09

## 2024-12-09 NOTE — TELEPHONE ENCOUNTER
Spoke to Joselyn and relayed Chiquita's message.  She stated that she has vertigo she cannot drive.  Asked if someone can bring her in.  She said she will check with her  and call back.    No further action required

## 2024-12-09 NOTE — TELEPHONE ENCOUNTER
The patient called she has a sinus infection and would like an antibiotic called in   she has a  headache  facial pain   pain up her sinuses   yellow nasal discharge  also her right ear is clogged  the patients has vertigo and cannot drive to the office  please let her know when the medication is called in so she can have it picked up

## 2024-12-17 ENCOUNTER — TELEMEDICINE (OUTPATIENT)
Dept: FAMILY MEDICINE CLINIC | Facility: CLINIC | Age: 69
End: 2024-12-17
Payer: MEDICARE

## 2024-12-17 DIAGNOSIS — J01.41 ACUTE RECURRENT PANSINUSITIS: Primary | ICD-10-CM

## 2024-12-17 PROCEDURE — 99213 OFFICE O/P EST LOW 20 MIN: CPT | Performed by: STUDENT IN AN ORGANIZED HEALTH CARE EDUCATION/TRAINING PROGRAM

## 2024-12-17 PROCEDURE — G2211 COMPLEX E/M VISIT ADD ON: HCPCS | Performed by: STUDENT IN AN ORGANIZED HEALTH CARE EDUCATION/TRAINING PROGRAM

## 2024-12-17 NOTE — PROGRESS NOTES
Virtual Brief Visit  Name: Joselyn Maki      : 1955      MRN: [id number]  Encounter Provider: Chel Blanc MD  Encounter Date: 2024   Encounter department: [facility]    This Visit is being completed by telephone. The Patient is located at Home and in the following state in which I hold an active license [place]    The patient was identified by name and date of birth. Joselyn Maki was informed that this is a telemedicine visit and that the visit is being conducted through the [employer] platform. She agrees to proceed..  My office door was closed. No one else was in the room.  She acknowledged consent and understanding of privacy and security of the video platform. The patient has agreed to participate and understands they can discontinue the visit at any time.    Patient is aware this is a billable service.     :Assessment & Plan  Acute recurrent pansinusitis    Orders:  •  amoxicillin-clavulanate (AUGMENTIN) 875-125 mg per tablet; Take 1 tablet by mouth every 12 (twelve) hours for 7 days          History of Present Illness   HPI    Patient notes she follows with ENT and has underdeveloped sinuses. She notes she is prone to sinus infections. She has sinus pressure and nasal congestion that started about a week ago. She has been taking allegra. She does have a headache. No known sick contacts. Up to date with flu and COVID vaccinations. She is alsp taking flonase.       Visit Time  Total Visit Duration: 20

## 2024-12-18 ENCOUNTER — TELEPHONE (OUTPATIENT)
Age: 69
End: 2024-12-18

## 2024-12-18 DIAGNOSIS — J01.41 ACUTE RECURRENT PANSINUSITIS: Primary | ICD-10-CM

## 2024-12-18 RX ORDER — METHYLPREDNISOLONE 4 MG/1
TABLET ORAL
Qty: 21 EACH | Refills: 0 | Status: SHIPPED | OUTPATIENT
Start: 2024-12-18

## 2024-12-18 NOTE — TELEPHONE ENCOUNTER
Patient called, had virtual visit yesterday 12/17/24.  Was given abx however she would like to have Prednisone as she wants to be better by Sarah and says Prednisone will help.  Please advise and if appropriate, call patient if Prednisone is ordered.  Thank you.

## 2024-12-19 ENCOUNTER — RESULTS FOLLOW-UP (OUTPATIENT)
Dept: FAMILY MEDICINE CLINIC | Facility: CLINIC | Age: 69
End: 2024-12-19

## 2025-02-04 DIAGNOSIS — I10 PRIMARY HYPERTENSION: ICD-10-CM

## 2025-02-04 NOTE — TELEPHONE ENCOUNTER
Reason for call:   [x] Refill   [] Prior Auth  [] Other:     Office:   [x] PCP/Provider -   [] Specialty/Provider -       candesartan (ATACAND) 16 mg tablet 16 mg, Oral, Daily       Quantity: 90    Pharmacy: [facility]    Does the patient have enough for 3 days?   [] Yes   [x] No - Send as HP to POD

## 2025-02-05 RX ORDER — CANDESARTAN 16 MG/1
16 TABLET ORAL DAILY
Qty: 90 TABLET | Refills: 1 | Status: SHIPPED | OUTPATIENT
Start: 2025-02-05

## 2025-03-10 DIAGNOSIS — I10 PRIMARY HYPERTENSION: ICD-10-CM

## 2025-03-11 RX ORDER — CANDESARTAN 16 MG/1
16 TABLET ORAL DAILY
Qty: 90 TABLET | Refills: 0 | OUTPATIENT
Start: 2025-03-11

## 2025-03-28 DIAGNOSIS — F43.23 ADJUSTMENT DISORDER WITH MIXED ANXIETY AND DEPRESSED MOOD: ICD-10-CM

## 2025-03-28 RX ORDER — PAROXETINE 10 MG/1
10 TABLET, FILM COATED ORAL DAILY
Qty: 90 TABLET | Refills: 1 | Status: SHIPPED | OUTPATIENT
Start: 2025-03-28

## 2025-03-28 NOTE — TELEPHONE ENCOUNTER
Requested medication(s) are due for refill today: Yes  Patient has already received a courtesy refill: No  Other reason request has been forwarded to provider: Last ordered by another provider. Please review.    No follow up scheduled with new provider.

## 2025-03-28 NOTE — TELEPHONE ENCOUNTER
Reason for call:   Patient out of medication for sometime now.   Patient state she will be seeing dr doan as Chiquita is no longer with the practice      [x] Refill   [] Prior Auth  [] Other:     Office:   [x] PCP/Provider -   [] Specialty/Provider -     Medication: paxil    Dose/Frequency: 10 mg daily     Quantity: 90D supply     Pharmacy: [facility] on file     Local Pharmacy   Does the patient have enough for 3 days?   [] Yes   [x] No - Send as HP to POD

## 2025-04-04 DIAGNOSIS — E78.01 FAMILIAL HYPERCHOLESTEROLEMIA: Primary | ICD-10-CM

## 2025-04-04 RX ORDER — ROSUVASTATIN CALCIUM 10 MG/1
10 TABLET, COATED ORAL DAILY
Qty: 90 TABLET | Refills: 0 | Status: SHIPPED | OUTPATIENT
Start: 2025-04-04

## 2025-04-04 NOTE — TELEPHONE ENCOUNTER
Lev is calling in regards to Joselyn's Crestor. He states that he has been trying to get this and the pharmacy is telling him they are no longer patients at the practice. She as last seen virtually with Dr. Blanc 12/2024.    Last dispensed 1/2025 by a previous PCP but authorization provider was Chiquita.     Please advise if we are able to send this medication to [facility] in [place].     Thank you!

## 2025-04-04 NOTE — TELEPHONE ENCOUNTER
Requested medication(s) are due for refill today: Yes  Patient has already received a courtesy refill: No  Other reason request has been forwarded to provider: Please see patient comments

## 2025-04-23 ENCOUNTER — HOSPITAL ENCOUNTER (OUTPATIENT)
Dept: RADIOLOGY | Facility: HOSPITAL | Age: 70
Discharge: HOME | End: 2025-04-23
Attending: PAIN MEDICINE
Payer: MEDICARE

## 2025-04-23 DIAGNOSIS — M47.896 OTHER SPONDYLOSIS, LUMBAR REGION: ICD-10-CM

## 2025-04-23 PROCEDURE — 72131 CT LUMBAR SPINE W/O DYE: CPT

## 2025-05-15 ENCOUNTER — HOSPITAL ENCOUNTER (OUTPATIENT)
Dept: RADIOLOGY | Facility: HOSPITAL | Age: 70
Discharge: HOME | End: 2025-05-15
Attending: PAIN MEDICINE
Payer: MEDICARE

## 2025-05-15 VITALS — WEIGHT: 118 LBS | HEIGHT: 62 IN | BODY MASS INDEX: 21.71 KG/M2

## 2025-05-15 DIAGNOSIS — M81.0 AGE-RELATED OSTEOPOROSIS WITHOUT CURRENT PATHOLOGICAL FRACTURE: ICD-10-CM

## 2025-05-15 PROCEDURE — 77080 DXA BONE DENSITY AXIAL: CPT

## 2025-05-27 ENCOUNTER — TELEPHONE (OUTPATIENT)
Dept: FAMILY MEDICINE CLINIC | Facility: CLINIC | Age: 70
End: 2025-05-27

## 2025-05-27 RX ORDER — LEVOTHYROXINE SODIUM 100 UG/1
100 TABLET ORAL DAILY
Refills: 0 | Status: CANCELLED | OUTPATIENT
Start: 2025-05-27

## 2025-05-27 NOTE — TELEPHONE ENCOUNTER
Reason for call:   [x] Refill   [] Prior Auth  [x] Other: Upcoming appointment with Dr Paul     Office:   [x] [facility]   [] Specialty/Provider -     Medication: levothyroxine 100 mcg tablet     Dose/Frequency: 100 mcg, Daily     Quantity: 90    Pharmacy: [id number]    Local Pharmacy   Does the patient have enough for 3 days?   [] Yes   [x] No - Send as HP to POD    Mail Away Pharmacy   Does the patient have enough for 10 days?   [] Yes   [] No - Send as HP to POD

## 2025-05-29 DIAGNOSIS — E03.9 HYPOTHYROIDISM, UNSPECIFIED TYPE: Primary | ICD-10-CM

## 2025-05-29 RX ORDER — LEVOTHYROXINE SODIUM 100 UG/1
100 TABLET ORAL DAILY
Qty: 90 TABLET | Refills: 1 | Status: SHIPPED | OUTPATIENT
Start: 2025-05-29

## 2025-05-29 NOTE — TELEPHONE ENCOUNTER
Spoke to Lev.  He was not happy and not too nice to me.    He stated that his wife has not taken her levothyroxine for 7 days.  Stated that he called a week ago for this medication (I only see a request for 5/27)  and he always has to call 3 times to get it filled.  This needing an appt is untrue, because Dr. Blanc has filled medication since Chiquita left.    I explained that we have protocols for refills, she could need bw for her refill to make sure that she is on the correct dosage.  He did not care about our protocols.    He stated that if this does not get filled, we can take his wife and him out of all appts.    Joselyn has an appt on 6/4 with [facility].  Not sure why, but there are no bw orders in her chart.  Last TSH was 6/11/24    Please advise       [phone]

## 2025-06-04 ENCOUNTER — OFFICE VISIT (OUTPATIENT)
Dept: FAMILY MEDICINE CLINIC | Facility: CLINIC | Age: 70
End: 2025-06-04
Payer: MEDICARE

## 2025-06-04 VITALS
WEIGHT: 118.6 LBS | SYSTOLIC BLOOD PRESSURE: 124 MMHG | HEIGHT: 62 IN | TEMPERATURE: 98.1 F | DIASTOLIC BLOOD PRESSURE: 60 MMHG | BODY MASS INDEX: 21.83 KG/M2 | OXYGEN SATURATION: 97 % | HEART RATE: 61 BPM | RESPIRATION RATE: 16 BRPM

## 2025-06-04 DIAGNOSIS — M54.16 CHRONIC RADICULAR PAIN OF LOWER BACK: ICD-10-CM

## 2025-06-04 DIAGNOSIS — E78.01 FAMILIAL HYPERCHOLESTEROLEMIA: ICD-10-CM

## 2025-06-04 DIAGNOSIS — M25.50 ARTHRALGIA, UNSPECIFIED JOINT: ICD-10-CM

## 2025-06-04 DIAGNOSIS — I10 PRIMARY HYPERTENSION: Primary | ICD-10-CM

## 2025-06-04 DIAGNOSIS — G89.29 CHRONIC RADICULAR PAIN OF LOWER BACK: ICD-10-CM

## 2025-06-04 DIAGNOSIS — M15.0 PRIMARY OSTEOARTHRITIS INVOLVING MULTIPLE JOINTS: ICD-10-CM

## 2025-06-04 DIAGNOSIS — F11.20 OPIOID DEPENDENCE, UNCOMPLICATED (HCC): ICD-10-CM

## 2025-06-04 DIAGNOSIS — E03.9 ACQUIRED HYPOTHYROIDISM: ICD-10-CM

## 2025-06-04 DIAGNOSIS — R53.83 OTHER FATIGUE: ICD-10-CM

## 2025-06-04 PROBLEM — Z01.818 PRE-OPERATIVE CLEARANCE: Status: RESOLVED | Noted: 2024-09-30 | Resolved: 2025-06-04

## 2025-06-04 PROCEDURE — 99214 OFFICE O/P EST MOD 30 MIN: CPT | Performed by: FAMILY MEDICINE

## 2025-06-04 PROCEDURE — G2211 COMPLEX E/M VISIT ADD ON: HCPCS | Performed by: FAMILY MEDICINE

## 2025-06-04 RX ORDER — PRAVASTATIN SODIUM 20 MG
TABLET ORAL EVERY 24 HOURS
COMMUNITY
End: 2025-06-04

## 2025-06-04 NOTE — ASSESSMENT & PLAN NOTE
STABLE  DENIES ANY CP, SOB, PALPITATIONS, OR HEADACHE  NOTES NO WATER RETENTION  COMPLIANT WITH MEDICATION  NO CONCERNS    - CONTINUE CURRENT TREATMENT PLAN  - MONITOR DIETARY SODIUM INTAKE  - ENCOURAGE PHYSICAL ACTIVITY  - RV 3 MONTHS    Orders:  •  Comprehensive metabolic panel; Future

## 2025-06-04 NOTE — PROGRESS NOTES
Name: Joselyn Maki      : 1955      MRN: [id number]  Encounter Provider: Shady Paul MD  Encounter Date: 2025   Encounter department: [facility]    Assessment & Plan  Primary hypertension  STABLE  DENIES ANY CP, SOB, PALPITATIONS, OR HEADACHE  NOTES NO WATER RETENTION  COMPLIANT WITH MEDICATION  NO CONCERNS    - CONTINUE CURRENT TREATMENT PLAN  - MONITOR DIETARY SODIUM INTAKE  - ENCOURAGE PHYSICAL ACTIVITY  - RV 3 MONTHS    Orders:  •  Comprehensive metabolic panel; Future    Familial hypercholesterolemia  WATCHING CHOLESTEROL INTAKE  COMPLIANT WITH MEDICATION  NO CONCERNS    - CONTINUE TO MONITOR DIETARY CHOL INTAKE  - CONTINUE CURRENT MEDICATION  - ENCOURAGED PHYSICAL ACTIVITY    Orders:  •  Lipid panel; Future    Acquired hypothyroidism  Stable        Orders:  •  TSH, 3rd generation; Future  •  T4; Future    Chronic radicular pain of lower back         Primary osteoarthritis involving multiple joints  STABLE  DENIES ANY JOINT SWELLING OR REDNESS  JOINT STIFFNESS PRESENT  PAIN MANAGEMENT ADEQUATE    - CONTINUE CURRENT MANAGEMENT  - MEDICATION AS DIRECTED  - CALL / RETURN IF SYMPTOMS WORSEN         Opioid dependence, uncomplicated (HCC)         Arthralgia, unspecified joint    Orders:  •  PANFILO Screen w/Reflex Cascade; Future  •  RHEUMATOID FACTOR; Future  •  Lyme Total AB W Reflex to IGM/IGG; Future  •  Cortisol; Future    Other fatigue    Orders:  •  CBC and differential; Future  •  TSH, 3rd generation; Future  •  T4; Future  •  Sedimentation rate, automated; Future  •  C-reactive protein; Future  •  Cortisol; Future      Urinary Incontinence Plan of Care: counseling topics discussed: practice Kegel (pelvic floor strengthening) exercises.         History of Present Illness     PATIENT RETURNS FOR ROUTINE EVALUATION OF PATIENT'S MEDICAL ISSUES    INDIVIDUAL MEDICAL ISSUES WITH THEIR CURRENT STATUS, ASSESSMENT AND PLANS ARE LISTED ABOVE            Review of Systems  "  Constitutional:  Positive for fatigue. Negative for chills and fever.   HENT:  Positive for congestion and postnasal drip. Negative for ear discharge, ear pain, mouth sores, sore throat and trouble swallowing.    Eyes:  Negative for pain, discharge and visual disturbance.   Respiratory:  Negative for cough, shortness of breath and wheezing.    Cardiovascular:  Negative for chest pain, palpitations and leg swelling.   Gastrointestinal:  Negative for abdominal distention, abdominal pain, blood in stool, diarrhea and nausea.   Endocrine: Negative for polydipsia, polyphagia and polyuria.   Genitourinary:  Negative for dysuria, frequency, hematuria and urgency.   Musculoskeletal:  Positive for arthralgias, back pain, myalgias, neck pain and neck stiffness. Negative for gait problem and joint swelling.   Skin:  Negative for pallor and rash.   Neurological:  Negative for dizziness, syncope, speech difficulty, weakness, light-headedness, numbness and headaches.   Hematological:  Negative for adenopathy.   Psychiatric/Behavioral:  Negative for behavioral problems, confusion and sleep disturbance. The patient is not nervous/anxious.      Past Medical History[1]  Past Surgical History[2]  Family History[3]  Social History[4]  Medications[5]  Allergies   Allergen Reactions   • Pneumococcal 20-Mandy Conj Vacc Rash and Confusion     Immunization History   Administered Date(s) Administered   • Pneumococcal Conjugate Vaccine 20-valent (Pcv20), Polysace 06/07/2024     Objective   /60   Pulse 61   Temp 98.1 °F (36.7 °C)   Resp 16   Ht 5' 2\" (1.575 m)   Wt 53.8 kg (118 lb 9.6 oz)   SpO2 97%   BMI 21.69 kg/m²     Physical Exam  Constitutional:       Appearance: She is well-developed.   HENT:      Head: Normocephalic and atraumatic.      Comments: PARTIALLY OCCLUDED WITH CERUMEN BILAT     Nose: Congestion present.      Mouth/Throat:      Mouth: Mucous membranes are moist.     Eyes:      General:         Right eye: No " discharge.         Left eye: No discharge.      Conjunctiva/sclera: Conjunctivae normal.      Pupils: Pupils are equal, round, and reactive to light.     Neck:      Thyroid: No thyromegaly.     Cardiovascular:      Rate and Rhythm: Normal rate and regular rhythm.      Heart sounds: Normal heart sounds. No murmur heard.  Pulmonary:      Effort: Pulmonary effort is normal. No respiratory distress.      Breath sounds: Normal breath sounds. No wheezing or rales.   Abdominal:      General: Bowel sounds are normal.      Palpations: Abdomen is soft.      Tenderness: There is no abdominal tenderness.     Musculoskeletal:         General: No tenderness.      Cervical back: Normal range of motion and neck supple.      Comments: MODERATE DJD CHANGES   Lymphadenopathy:      Cervical: No cervical adenopathy.     Skin:     General: Skin is warm and dry.      Findings: No erythema or rash.     Neurological:      General: No focal deficit present.      Mental Status: She is alert and oriented to person, place, and time.     Psychiatric:         Behavior: Behavior normal.         Thought Content: Thought content normal.         Judgment: Judgment normal.                [1]  Past Medical History:  Diagnosis Date   • Arthritis    • Lyme disease    [2]  Past Surgical History:  Procedure Laterality Date   • BREAST BIOPSY Right     date unknown- benign   • MAMMO STEREOTACTIC BREAST BIOPSY RIGHT (ALL INC) Right 2015   • NASAL POLYP SURGERY     [3]  Family History  Problem Relation Name Age of Onset   • Breast cancer Mother  72   • Lymphoma Mother     • Lupus Sister     • Lupus Daughter     • Denys's disease Daughter     • Cancer Brother     [4]  Social History  Tobacco Use   • Smoking status: Former     Current packs/day: 0.00     Average packs/day: 1.5 packs/day for 47.0 years (70.5 ttl pk-yrs)     Types: Cigarettes     Start date:      Quit date: 2012     Years since quittin.4     Passive exposure: Past   •  Smokeless tobacco: Never   Vaping Use   • Vaping status: Every Day   • Start date: 6/15/2012   • Substances: Nicotine, Flavoring   Substance and Sexual Activity   • Alcohol use: Never   • Drug use: Yes     Types: Marijuana     Comment: occasionally   • Sexual activity: Yes     Partners: Male   [5]  Current Outpatient Medications on File Prior to Visit   Medication Sig   • candesartan (ATACAND) 16 mg tablet Take 1 tablet (16 mg total) by mouth daily   • diazepam (VALIUM) 2 mg tablet Take 1 tablet (2 mg total) by mouth daily as needed for anxiety   • estradiol (ESTRACE) 2 MG tablet TAKE ONE TABLET BY MOUTH EVERY DAY (GENERIC FOR ESTRACE)   • fexofenadine (ALLEGRA) 60 MG tablet Take 60 mg by mouth in the morning.   • fluticasone (FLONASE) 50 mcg/act nasal spray 1 spray into each nostril as needed   • levothyroxine 100 mcg tablet Take 1 tablet (100 mcg total) by mouth daily   • oxyCODONE-acetaminophen (PERCOCET)  mg per tablet    • PARoxetine (PAXIL) 10 mg tablet Take 1 tablet (10 mg total) by mouth daily   • Progesterone 100 MG CAPS Take 100 mg by mouth in the morning.   • rosuvastatin (CRESTOR) 10 MG tablet Take 1 tablet (10 mg total) by mouth daily   • tiZANidine (ZANAFLEX) 4 mg tablet Take 4 mg by mouth daily as needed   • [DISCONTINUED] pravastatin (PRAVACHOL) 20 mg tablet Take by mouth every 24 hours   • naloxone (NARCAN) 4 mg/0.1 mL nasal spray Administer 1 spray into a nostril. If no response after 2-3 minutes, give another dose in the other nostril using a new spray. (Patient not taking: Reported on 6/4/2025)   • [DISCONTINUED] methylPREDNISolone 4 MG tablet therapy pack Use as directed on package (Patient not taking: Reported on 6/4/2025)   • [DISCONTINUED] terconazole (TERAZOL 7) 0.4 % vaginal cream Insert 1 applicator into the vagina daily at bedtime (Patient not taking: Reported on 6/4/2025)

## 2025-06-04 NOTE — ASSESSMENT & PLAN NOTE
WATCHING CHOLESTEROL INTAKE  COMPLIANT WITH MEDICATION  NO CONCERNS    - CONTINUE TO MONITOR DIETARY CHOL INTAKE  - CONTINUE CURRENT MEDICATION  - ENCOURAGED PHYSICAL ACTIVITY    Orders:  •  Lipid panel; Future

## 2025-06-12 NOTE — PATIENT INSTRUCTIONS
Saline rinses daily    Fluticasone nasal spray one spray each nostril twice per day    Allegra D for one to two weeks    Home exercises    Physical therapy as soon as possible    Return if no improvement    May use hydrocortisone cream to ears as needed for itching, decrease use of q tips
Standing/Walking

## 2025-07-15 ENCOUNTER — TELEPHONE (OUTPATIENT)
Age: 70
End: 2025-07-15

## 2025-07-17 ENCOUNTER — APPOINTMENT (OUTPATIENT)
Dept: RADIOLOGY | Facility: CLINIC | Age: 70
End: 2025-07-17
Attending: PHYSICIAN ASSISTANT
Payer: MEDICARE

## 2025-07-17 ENCOUNTER — OFFICE VISIT (OUTPATIENT)
Dept: URGENT CARE | Facility: CLINIC | Age: 70
End: 2025-07-17
Payer: MEDICARE

## 2025-07-17 VITALS
WEIGHT: 122 LBS | TEMPERATURE: 97.6 F | DIASTOLIC BLOOD PRESSURE: 68 MMHG | HEART RATE: 75 BPM | BODY MASS INDEX: 22.31 KG/M2 | OXYGEN SATURATION: 96 % | SYSTOLIC BLOOD PRESSURE: 98 MMHG | RESPIRATION RATE: 14 BRPM

## 2025-07-17 DIAGNOSIS — S29.9XXA RIB INJURY: ICD-10-CM

## 2025-07-17 DIAGNOSIS — J40 BRONCHITIS: ICD-10-CM

## 2025-07-17 DIAGNOSIS — S29.9XXA RIB INJURY: Primary | ICD-10-CM

## 2025-07-17 PROCEDURE — 71101 X-RAY EXAM UNILAT RIBS/CHEST: CPT

## 2025-07-17 PROCEDURE — 99213 OFFICE O/P EST LOW 20 MIN: CPT | Performed by: PHYSICIAN ASSISTANT

## 2025-07-17 RX ORDER — AZITHROMYCIN 250 MG/1
TABLET, FILM COATED ORAL
Qty: 6 TABLET | Refills: 0 | Status: SHIPPED | OUTPATIENT
Start: 2025-07-17 | End: 2025-07-21

## 2025-07-17 RX ORDER — LIDOCAINE 50 MG/G
1 PATCH TOPICAL DAILY
Qty: 30 PATCH | Refills: 0 | Status: SHIPPED | OUTPATIENT
Start: 2025-07-17

## 2025-07-17 RX ORDER — BENZONATATE 100 MG/1
100 CAPSULE ORAL 3 TIMES DAILY PRN
Qty: 30 CAPSULE | Refills: 0 | Status: SHIPPED | OUTPATIENT
Start: 2025-07-17

## 2025-07-17 NOTE — PROGRESS NOTES
[facility]        NAME: Joselyn Maki is a 70 y.o. female  : 1955    MRN: [id number]  DATE: 2025  TIME: 12:16 PM    Assessment and Plan   No primary diagnosis found.  No diagnosis found.      Patient Instructions   There are no Patient Instructions on file for this visit.      Follow up with PCP in 3-5 days.  Proceed to  ER if symptoms worsen.    Chief Complaint     Chief Complaint   Patient presents with   • Rib Injury     Pt presents with left sided upper rib injury r/t falling five days ago, discomfort with deep breathing and lifting arm, twisting// worse with cough/ productive cough with yellow exudate         History of Present Illness       HPI    Review of Systems   Review of Systems      Current Medications     Current Medications[1]    Current Allergies     Allergies as of 2025 - Reviewed 2025   Allergen Reaction Noted   • Pneumococcal 20-alan conj vacc Rash and Confusion 2024            The following portions of the patient's history were reviewed and updated as appropriate: allergies, current medications, past family history, past medical history, past social history, past surgical history and problem list.     Past Medical History[2]    Past Surgical History[3]    Family History[4]      Medications have been verified.        Objective   Wt 55.3 kg (122 lb)   BMI 22.31 kg/m²        Physical Exam     Physical Exam                     [1]    Current Outpatient Medications:   •  candesartan (ATACAND) 16 mg tablet, Take 1 tablet (16 mg total) by mouth daily, Disp: 90 tablet, Rfl: 1  •  diazepam (VALIUM) 2 mg tablet, Take 1 tablet (2 mg total) by mouth daily as needed for anxiety, Disp: 15 tablet, Rfl: 0  •  estradiol (ESTRACE) 2 MG tablet, TAKE ONE TABLET BY MOUTH EVERY DAY (GENERIC FOR ESTRACE), Disp: 30 tablet, Rfl: 5  •  fexofenadine (ALLEGRA) 60 MG tablet, Take 60 mg by mouth in the morning., Disp: , Rfl:   •  fluticasone (FLONASE) 50 mcg/act nasal spray, 1  spray into each nostril as needed, Disp: , Rfl:   •  levothyroxine 100 mcg tablet, Take 1 tablet (100 mcg total) by mouth daily, Disp: 90 tablet, Rfl: 1  •  oxyCODONE-acetaminophen (PERCOCET)  mg per tablet, , Disp: , Rfl:   •  PARoxetine (PAXIL) 10 mg tablet, Take 1 tablet (10 mg total) by mouth daily, Disp: 90 tablet, Rfl: 1  •  Progesterone 100 MG CAPS, Take 100 mg by mouth in the morning., Disp: , Rfl:   •  rosuvastatin (CRESTOR) 10 MG tablet, Take 1 tablet (10 mg total) by mouth daily, Disp: 90 tablet, Rfl: 0  •  tiZANidine (ZANAFLEX) 4 mg tablet, Take 4 mg by mouth daily as needed, Disp: , Rfl:   •  naloxone (NARCAN) 4 mg/0.1 mL nasal spray, Administer 1 spray into a nostril. If no response after 2-3 minutes, give another dose in the other nostril using a new spray. (Patient not taking: Reported on 6/4/2025), Disp: 1 each, Rfl: 1  [2]  Past Medical History:  Diagnosis Date   • Arthritis    • Disease of thyroid gland    • Lyme disease    [3]  Past Surgical History:  Procedure Laterality Date   • BREAST BIOPSY Right     date unknown- benign   • MAMMO STEREOTACTIC BREAST BIOPSY RIGHT (ALL INC) Right 02/16/2015   • NASAL POLYP SURGERY  1992   [4]  Family History  Problem Relation Name Age of Onset   • Breast cancer Mother  72   • Lymphoma Mother     • Lupus Sister     • Lupus Daughter     • [place]'s disease Daughter     • Cancer Brother

## 2025-07-17 NOTE — PATIENT INSTRUCTIONS
Take the cough medicine as needed.  Lidocaine patches to the area.  Take the antibiotic as directed.

## 2025-07-17 NOTE — PROGRESS NOTES
[facility]        NAME: Joselyn Maki is a 70 y.o. female  : 1955    MRN: [id number]  DATE: 2025  TIME: 2:32 PM    Assessment and Plan   Rib injury [S29.9XXA]  1. Rib injury  XR ribs left w pa chest min 3 views    lidocaine (Lidoderm) 5 %      2. Bronchitis  azithromycin (ZITHROMAX) 250 mg tablet    benzonatate (TESSALON PERLES) 100 mg capsule        ER if worsening.  Will call with the official radiology report.    Patient Instructions       Follow up with PCP in 3-5 days.  Proceed to  ER if symptoms worsen.    Chief Complaint     Chief Complaint   Patient presents with    Rib Injury     Pt presents with left sided upper rib injury r/t falling five days ago, discomfort with deep breathing and lifting arm, twisting// worse with cough/ productive cough with yellow exudate     Plan:  XR ribs showed no acute fractures  Prescribed azithromycin (Zithromax) 250 mg capsule PO x 4 days, benzonatate (Tessalon Perles) 100 mg capsule TID PRN cough   Prescribed Lidocaine 5% patch PRN pain  Monitor for worsening symptoms, high fevers, shortness of breath.     History of Present Illness       Joselyn is a 70 YOF with a past medical history of 70 packyear smoking hx, lumbar radiculopathy S/P lumbar fusion, osteoarthritis, and hypertension, who presents for left-sided rib pain x 5 days. She states the pain was precipitated by a mechanical fall on wood [place] at ground height. She then noticed tenderness to her left side of her chest. It is exacerbated by arm movement, palpation, and deep breathing. She has been taking Percocet following her surgery which helps her pain currently. She additionally endorses productive cough with yellow sputum x 2 days. She denies history of PE. She denies associated fevers/chills, shortness of breath, calf pain, sore throat.       Review of Systems   Review of Systems   Constitutional:  Negative for activity change, appetite change, chills, fatigue and fever.   HENT:   Negative for congestion and sore throat.    Eyes:  Negative for discharge and itching.   Respiratory:  Positive for cough. Negative for choking, chest tightness and shortness of breath.    Cardiovascular:  Negative for chest pain, palpitations and leg swelling.   Musculoskeletal:  Positive for arthralgias, back pain and myalgias. Negative for joint swelling.   Skin:  Negative for color change, pallor, rash and wound.   Neurological:  Negative for dizziness, weakness, light-headedness and headaches.         Current Medications     Current Medications[1]    Current Allergies     Allergies as of 07/17/2025 - Reviewed 07/17/2025   Allergen Reaction Noted    Pneumococcal 20-alan conj vacc Rash and Confusion 06/20/2024            The following portions of the patient's history were reviewed and updated as appropriate: allergies, current medications, past family history, past medical history, past social history, past surgical history and problem list.     Past Medical History[2]    Past Surgical History[3]    Family History[4]      Medications have been verified.        Objective   BP 98/68   Pulse 75   Temp 97.6 °F (36.4 °C)   Resp 14   Wt 55.3 kg (122 lb)   SpO2 96%   BMI 22.31 kg/m²        Physical Exam     Physical Exam  Vitals reviewed.   Constitutional:       General: She is not in acute distress.     Appearance: Normal appearance. She is not toxic-appearing.   HENT:      Head: Normocephalic and atraumatic.      Right Ear: Tympanic membrane, ear canal and external ear normal. There is no impacted cerumen.      Left Ear: Tympanic membrane, ear canal and external ear normal. There is no impacted cerumen.      Nose: Nose normal. No congestion or rhinorrhea.      Mouth/Throat:      Mouth: Mucous membranes are moist.      Pharynx: Oropharynx is clear. No oropharyngeal exudate or posterior oropharyngeal erythema.     Cardiovascular:      Rate and Rhythm: Normal rate and regular rhythm.      Heart sounds: No murmur  heard.     No friction rub. No gallop.   Pulmonary:      Effort: Pulmonary effort is normal. No respiratory distress.      Breath sounds: No stridor. Rhonchi (some scattered) present. No wheezing or rales.   Chest:      Chest wall: No tenderness.     Musculoskeletal:         General: Tenderness (left chest and left scapular area) present. No swelling, deformity or signs of injury. Normal range of motion.      Right shoulder: Normal.      Left shoulder: Normal.      Cervical back: Normal range of motion.     Skin:     General: Skin is warm and dry.      Capillary Refill: Capillary refill takes less than 2 seconds.     Neurological:      General: No focal deficit present.      Mental Status: She is alert and oriented to person, place, and time.     Psychiatric:         Mood and Affect: Mood normal.         Behavior: Behavior normal.                           [1]   Current Outpatient Medications:     azithromycin (ZITHROMAX) 250 mg tablet, 2 tabs on day 1, then 1 tab daily for 4 days, Disp: 6 tablet, Rfl: 0    benzonatate (TESSALON PERLES) 100 mg capsule, Take 1 capsule (100 mg total) by mouth 3 (three) times a day as needed for cough, Disp: 30 capsule, Rfl: 0    candesartan (ATACAND) 16 mg tablet, Take 1 tablet (16 mg total) by mouth daily, Disp: 90 tablet, Rfl: 1    diazepam (VALIUM) 2 mg tablet, Take 1 tablet (2 mg total) by mouth daily as needed for anxiety, Disp: 15 tablet, Rfl: 0    estradiol (ESTRACE) 2 MG tablet, TAKE ONE TABLET BY MOUTH EVERY DAY (GENERIC FOR ESTRACE), Disp: 30 tablet, Rfl: 5    fexofenadine (ALLEGRA) 60 MG tablet, Take 60 mg by mouth in the morning., Disp: , Rfl:     fluticasone (FLONASE) 50 mcg/act nasal spray, 1 spray into each nostril as needed, Disp: , Rfl:     levothyroxine 100 mcg tablet, Take 1 tablet (100 mcg total) by mouth daily, Disp: 90 tablet, Rfl: 1    lidocaine (Lidoderm) 5 %, Apply 1 patch topically daily over 12 hours Remove & Discard patch within 12 hours or as directed by  MD, Disp: 30 patch, Rfl: 0    oxyCODONE-acetaminophen (PERCOCET)  mg per tablet, , Disp: , Rfl:     PARoxetine (PAXIL) 10 mg tablet, Take 1 tablet (10 mg total) by mouth daily, Disp: 90 tablet, Rfl: 1    Progesterone 100 MG CAPS, Take 100 mg by mouth in the morning., Disp: , Rfl:     rosuvastatin (CRESTOR) 10 MG tablet, Take 1 tablet (10 mg total) by mouth daily, Disp: 90 tablet, Rfl: 0    tiZANidine (ZANAFLEX) 4 mg tablet, Take 4 mg by mouth daily as needed, Disp: , Rfl:     naloxone (NARCAN) 4 mg/0.1 mL nasal spray, Administer 1 spray into a nostril. If no response after 2-3 minutes, give another dose in the other nostril using a new spray. (Patient not taking: Reported on 6/4/2025), Disp: 1 each, Rfl: 1  [2]   Past Medical History:  Diagnosis Date    Arthritis     Disease of thyroid gland     Lyme disease    [3]   Past Surgical History:  Procedure Laterality Date    BREAST BIOPSY Right     date unknown- benign    MAMMO STEREOTACTIC BREAST BIOPSY RIGHT (ALL INC) Right 02/16/2015    NASAL POLYP SURGERY  1992   [4]   Family History  Problem Relation Name Age of Onset    Breast cancer Mother  72    Lymphoma Mother      Lupus Sister      Lupus Daughter      [place]ys's disease Daughter      Cancer Brother

## 2025-08-13 ENCOUNTER — OFFICE VISIT (OUTPATIENT)
Age: 70
End: 2025-08-13
Payer: MEDICARE

## 2025-08-18 DIAGNOSIS — E78.01 FAMILIAL HYPERCHOLESTEROLEMIA: ICD-10-CM

## 2025-08-19 DIAGNOSIS — I10 PRIMARY HYPERTENSION: ICD-10-CM

## 2025-08-20 RX ORDER — ROSUVASTATIN CALCIUM 10 MG/1
10 TABLET, COATED ORAL DAILY
Qty: 30 TABLET | Refills: 0 | Status: SHIPPED | OUTPATIENT
Start: 2025-08-20

## 2025-08-21 RX ORDER — CANDESARTAN 16 MG/1
16 TABLET ORAL DAILY
Qty: 90 TABLET | Refills: 1 | Status: SHIPPED | OUTPATIENT
Start: 2025-08-21